# Patient Record
Sex: FEMALE | Race: BLACK OR AFRICAN AMERICAN | NOT HISPANIC OR LATINO | Employment: FULL TIME | RURAL
[De-identification: names, ages, dates, MRNs, and addresses within clinical notes are randomized per-mention and may not be internally consistent; named-entity substitution may affect disease eponyms.]

---

## 2020-12-07 LAB — PAP RECOMMENDATION EXT: NORMAL

## 2021-03-01 ENCOUNTER — HISTORICAL (OUTPATIENT)
Dept: ADMINISTRATIVE | Facility: HOSPITAL | Age: 36
End: 2021-03-01

## 2021-07-06 ENCOUNTER — OFFICE VISIT (OUTPATIENT)
Dept: FAMILY MEDICINE | Facility: CLINIC | Age: 36
End: 2021-07-06
Payer: COMMERCIAL

## 2021-07-06 VITALS
WEIGHT: 279.38 LBS | DIASTOLIC BLOOD PRESSURE: 83 MMHG | OXYGEN SATURATION: 98 % | HEIGHT: 67 IN | SYSTOLIC BLOOD PRESSURE: 127 MMHG | BODY MASS INDEX: 43.85 KG/M2 | TEMPERATURE: 98 F | HEART RATE: 66 BPM

## 2021-07-06 DIAGNOSIS — R53.83 TIREDNESS: Primary | ICD-10-CM

## 2021-07-06 DIAGNOSIS — I10 ESSENTIAL HYPERTENSION: ICD-10-CM

## 2021-07-06 LAB
ALBUMIN SERPL BCP-MCNC: 3.4 G/DL (ref 3.5–5)
ALBUMIN/GLOB SERPL: 0.7 {RATIO}
ALP SERPL-CCNC: 84 U/L (ref 37–98)
ALT SERPL W P-5'-P-CCNC: 21 U/L (ref 13–56)
ANION GAP SERPL CALCULATED.3IONS-SCNC: 7 MMOL/L (ref 7–16)
AST SERPL W P-5'-P-CCNC: 19 U/L (ref 15–37)
BASOPHILS # BLD AUTO: 0.02 K/UL (ref 0–0.2)
BASOPHILS NFR BLD AUTO: 0.3 % (ref 0–1)
BILIRUB SERPL-MCNC: 0.3 MG/DL (ref 0–1.2)
BUN SERPL-MCNC: 13 MG/DL (ref 7–18)
BUN/CREAT SERPL: 16 (ref 6–20)
CALCIUM SERPL-MCNC: 9.3 MG/DL (ref 8.5–10.1)
CHLORIDE SERPL-SCNC: 104 MMOL/L (ref 98–107)
CO2 SERPL-SCNC: 30 MMOL/L (ref 21–32)
CREAT SERPL-MCNC: 0.81 MG/DL (ref 0.55–1.02)
DIFFERENTIAL METHOD BLD: ABNORMAL
EOSINOPHIL # BLD AUTO: 0.08 K/UL (ref 0–0.5)
EOSINOPHIL NFR BLD AUTO: 1.4 % (ref 1–4)
ERYTHROCYTE [DISTWIDTH] IN BLOOD BY AUTOMATED COUNT: 13.1 % (ref 11.5–14.5)
GLOBULIN SER-MCNC: 5.1 G/DL (ref 2–4)
GLUCOSE SERPL-MCNC: 88 MG/DL (ref 74–106)
HCT VFR BLD AUTO: 37.4 % (ref 38–47)
HGB BLD-MCNC: 12.3 G/DL (ref 12–16)
IMM GRANULOCYTES # BLD AUTO: 0.01 K/UL (ref 0–0.04)
IMM GRANULOCYTES NFR BLD: 0.2 % (ref 0–0.4)
LYMPHOCYTES # BLD AUTO: 2.18 K/UL (ref 1–4.8)
LYMPHOCYTES NFR BLD AUTO: 38.1 % (ref 27–41)
MCH RBC QN AUTO: 29.4 PG (ref 27–31)
MCHC RBC AUTO-ENTMCNC: 32.9 G/DL (ref 32–36)
MCV RBC AUTO: 89.3 FL (ref 80–96)
MONOCYTES # BLD AUTO: 0.44 K/UL (ref 0–0.8)
MONOCYTES NFR BLD AUTO: 7.7 % (ref 2–6)
MPC BLD CALC-MCNC: 11.8 FL (ref 9.4–12.4)
NEUTROPHILS # BLD AUTO: 2.99 K/UL (ref 1.8–7.7)
NEUTROPHILS NFR BLD AUTO: 52.3 % (ref 53–65)
NRBC # BLD AUTO: 0 X10E3/UL
NRBC, AUTO (.00): 0 %
PLATELET # BLD AUTO: 299 K/UL (ref 150–400)
POTASSIUM SERPL-SCNC: 3.7 MMOL/L (ref 3.5–5.1)
PROT SERPL-MCNC: 8.5 G/DL (ref 6.4–8.2)
RBC # BLD AUTO: 4.19 M/UL (ref 4.2–5.4)
SODIUM SERPL-SCNC: 137 MMOL/L (ref 136–145)
WBC # BLD AUTO: 5.72 K/UL (ref 4.5–11)

## 2021-07-06 PROCEDURE — 85025 CBC WITH DIFFERENTIAL: ICD-10-PCS | Mod: ,,, | Performed by: CLINICAL MEDICAL LABORATORY

## 2021-07-06 PROCEDURE — 80053 COMPREHENSIVE METABOLIC PANEL: ICD-10-PCS | Mod: ,,, | Performed by: CLINICAL MEDICAL LABORATORY

## 2021-07-06 PROCEDURE — 85025 COMPLETE CBC W/AUTO DIFF WBC: CPT | Mod: ,,, | Performed by: CLINICAL MEDICAL LABORATORY

## 2021-07-06 PROCEDURE — 80053 COMPREHEN METABOLIC PANEL: CPT | Mod: ,,, | Performed by: CLINICAL MEDICAL LABORATORY

## 2021-07-06 PROCEDURE — 99213 OFFICE O/P EST LOW 20 MIN: CPT | Mod: ,,, | Performed by: NURSE PRACTITIONER

## 2021-07-06 PROCEDURE — 99213 PR OFFICE/OUTPT VISIT, EST, LEVL III, 20-29 MIN: ICD-10-PCS | Mod: ,,, | Performed by: NURSE PRACTITIONER

## 2021-07-06 RX ORDER — LOSARTAN POTASSIUM 50 MG/1
50 TABLET ORAL DAILY
COMMUNITY
End: 2021-07-21

## 2021-07-06 RX ORDER — HYDROCHLOROTHIAZIDE 12.5 MG/1
12.5 CAPSULE ORAL DAILY
Qty: 30 CAPSULE | Refills: 11 | Status: SHIPPED | OUTPATIENT
Start: 2021-07-06 | End: 2022-08-16

## 2021-07-06 RX ORDER — HYDROCHLOROTHIAZIDE 25 MG/1
25 TABLET ORAL DAILY
COMMUNITY
End: 2021-07-06 | Stop reason: ALTCHOICE

## 2021-12-29 ENCOUNTER — INFUSION (OUTPATIENT)
Dept: INFECTIOUS DISEASES | Facility: HOSPITAL | Age: 36
End: 2021-12-29
Attending: NURSE PRACTITIONER
Payer: MEDICAID

## 2021-12-29 VITALS
RESPIRATION RATE: 16 BRPM | SYSTOLIC BLOOD PRESSURE: 133 MMHG | HEART RATE: 71 BPM | DIASTOLIC BLOOD PRESSURE: 69 MMHG | OXYGEN SATURATION: 98 %

## 2021-12-29 DIAGNOSIS — U07.1 COVID: Primary | ICD-10-CM

## 2021-12-29 PROCEDURE — M0243 CASIRIVI AND IMDEVI INFUSION: HCPCS

## 2021-12-29 PROCEDURE — 63600175 PHARM REV CODE 636 W HCPCS

## 2021-12-29 PROCEDURE — 25000003 PHARM REV CODE 250

## 2021-12-29 RX ORDER — SODIUM CHLORIDE 0.9 % (FLUSH) 0.9 %
10 SYRINGE (ML) INJECTION
Status: DISCONTINUED | OUTPATIENT
Start: 2021-12-29 | End: 2022-04-03

## 2021-12-29 RX ORDER — ACETAMINOPHEN 325 MG/1
650 TABLET ORAL ONCE AS NEEDED
Status: DISCONTINUED | OUTPATIENT
Start: 2021-12-29 | End: 2022-04-03

## 2021-12-29 RX ORDER — METHYLPREDNISOLONE SOD SUCC 125 MG
40 VIAL (EA) INJECTION ONCE AS NEEDED
Status: DISCONTINUED | OUTPATIENT
Start: 2021-12-29 | End: 2022-04-03

## 2021-12-29 RX ORDER — ALBUTEROL SULFATE 90 UG/1
2 AEROSOL, METERED RESPIRATORY (INHALATION)
Status: DISCONTINUED | OUTPATIENT
Start: 2021-12-29 | End: 2022-04-03

## 2021-12-29 RX ORDER — ONDANSETRON 4 MG/1
4 TABLET, ORALLY DISINTEGRATING ORAL ONCE AS NEEDED
Status: DISCONTINUED | OUTPATIENT
Start: 2021-12-29 | End: 2022-04-03

## 2021-12-29 RX ORDER — DIPHENHYDRAMINE HYDROCHLORIDE 50 MG/ML
25 INJECTION INTRAMUSCULAR; INTRAVENOUS ONCE AS NEEDED
Status: DISCONTINUED | OUTPATIENT
Start: 2021-12-29 | End: 2022-04-03

## 2021-12-29 RX ORDER — EPINEPHRINE 0.3 MG/.3ML
0.3 INJECTION SUBCUTANEOUS
Status: DISCONTINUED | OUTPATIENT
Start: 2021-12-29 | End: 2022-04-03

## 2021-12-29 RX ADMIN — CASIRIVIMAB AND IMDEVIMAB 600 MG: 600; 600 INJECTION, SOLUTION, CONCENTRATE INTRAVENOUS at 01:12

## 2022-03-31 ENCOUNTER — OFFICE VISIT (OUTPATIENT)
Dept: FAMILY MEDICINE | Facility: CLINIC | Age: 37
End: 2022-03-31
Payer: MEDICAID

## 2022-03-31 VITALS
SYSTOLIC BLOOD PRESSURE: 132 MMHG | DIASTOLIC BLOOD PRESSURE: 78 MMHG | HEIGHT: 67 IN | OXYGEN SATURATION: 100 % | RESPIRATION RATE: 20 BRPM | BODY MASS INDEX: 44.32 KG/M2 | WEIGHT: 282.38 LBS | HEART RATE: 70 BPM | TEMPERATURE: 98 F

## 2022-03-31 DIAGNOSIS — K65.1: Primary | ICD-10-CM

## 2022-03-31 PROCEDURE — 99213 OFFICE O/P EST LOW 20 MIN: CPT | Mod: ,,, | Performed by: NURSE PRACTITIONER

## 2022-03-31 PROCEDURE — 99213 PR OFFICE/OUTPT VISIT, EST, LEVL III, 20-29 MIN: ICD-10-PCS | Mod: ,,, | Performed by: NURSE PRACTITIONER

## 2022-03-31 PROCEDURE — 4010F ACE/ARB THERAPY RXD/TAKEN: CPT | Mod: CPTII,,, | Performed by: NURSE PRACTITIONER

## 2022-03-31 PROCEDURE — 3078F DIAST BP <80 MM HG: CPT | Mod: CPTII,,, | Performed by: NURSE PRACTITIONER

## 2022-03-31 PROCEDURE — 3075F PR MOST RECENT SYSTOLIC BLOOD PRESS GE 130-139MM HG: ICD-10-PCS | Mod: CPTII,,, | Performed by: NURSE PRACTITIONER

## 2022-03-31 PROCEDURE — 1159F MED LIST DOCD IN RCRD: CPT | Mod: CPTII,,, | Performed by: NURSE PRACTITIONER

## 2022-03-31 PROCEDURE — 3075F SYST BP GE 130 - 139MM HG: CPT | Mod: CPTII,,, | Performed by: NURSE PRACTITIONER

## 2022-03-31 PROCEDURE — 3008F BODY MASS INDEX DOCD: CPT | Mod: CPTII,,, | Performed by: NURSE PRACTITIONER

## 2022-03-31 PROCEDURE — 4010F PR ACE/ARB THEARPY RXD/TAKEN: ICD-10-PCS | Mod: CPTII,,, | Performed by: NURSE PRACTITIONER

## 2022-03-31 PROCEDURE — 1159F PR MEDICATION LIST DOCUMENTED IN MEDICAL RECORD: ICD-10-PCS | Mod: CPTII,,, | Performed by: NURSE PRACTITIONER

## 2022-03-31 PROCEDURE — 3078F PR MOST RECENT DIASTOLIC BLOOD PRESSURE < 80 MM HG: ICD-10-PCS | Mod: CPTII,,, | Performed by: NURSE PRACTITIONER

## 2022-03-31 PROCEDURE — 3008F PR BODY MASS INDEX (BMI) DOCUMENTED: ICD-10-PCS | Mod: CPTII,,, | Performed by: NURSE PRACTITIONER

## 2022-03-31 RX ORDER — MUPIROCIN 20 MG/G
OINTMENT TOPICAL 3 TIMES DAILY
Qty: 22 G | Refills: 0 | Status: SHIPPED | OUTPATIENT
Start: 2022-03-31 | End: 2023-02-17

## 2022-03-31 RX ORDER — ACETAMINOPHEN AND CODEINE PHOSPHATE 300; 30 MG/1; MG/1
1 TABLET ORAL EVERY 4 HOURS PRN
Qty: 24 TABLET | Refills: 0 | Status: SHIPPED | OUTPATIENT
Start: 2022-03-31 | End: 2023-02-17

## 2022-03-31 RX ORDER — INDOMETHACIN 50 MG/1
50 CAPSULE ORAL 2 TIMES DAILY WITH MEALS
Qty: 20 CAPSULE | Refills: 0 | Status: SHIPPED | OUTPATIENT
Start: 2022-03-31 | End: 2022-04-10

## 2022-03-31 RX ORDER — SULFAMETHOXAZOLE AND TRIMETHOPRIM 800; 160 MG/1; MG/1
1 TABLET ORAL 2 TIMES DAILY
Qty: 14 TABLET | Refills: 0 | Status: SHIPPED | OUTPATIENT
Start: 2022-03-31 | End: 2022-04-03

## 2022-04-03 PROBLEM — R53.83 TIREDNESS: Status: RESOLVED | Noted: 2021-07-06 | Resolved: 2022-04-03

## 2022-04-03 RX ORDER — DOXYCYCLINE 100 MG/1
100 CAPSULE ORAL 2 TIMES DAILY
Qty: 14 CAPSULE | Refills: 0 | Status: SHIPPED | OUTPATIENT
Start: 2022-04-03 | End: 2022-04-10

## 2022-04-03 NOTE — PROGRESS NOTES
New Clinic Note    Mateus Ballesteros is a 37 y.o. female presents to the clinic with c/o abscess to right upper abd on and off for a year, now has flared back up again, was I and D by another provider months ago but pt states drainage was only blood.  Started out as a elevated lesion with no symptoms, months later abscess formed in it.      Chief complaints    Chief Complaint   Patient presents with    risen     Under right abd        Subjective:    HPI       Current Outpatient Medications:     hydroCHLOROthiazide (MICROZIDE) 12.5 mg capsule, Take 1 capsule (12.5 mg total) by mouth once daily., Disp: 30 capsule, Rfl: 11    losartan (COZAAR) 50 MG tablet, TAKE ONE (1) TABLET BY MOUTH EVERY DAY, Disp: 90 tablet, Rfl: 0    acetaminophen-codeine 300-30mg (TYLENOL #3) 300-30 mg Tab, Take 1 tablet by mouth every 4 (four) hours as needed (abscess pain)., Disp: 24 tablet, Rfl: 0    doxycycline (VIBRAMYCIN) 100 MG Cap, Take 1 capsule (100 mg total) by mouth 2 (two) times daily. for 7 days, Disp: 14 capsule, Rfl: 0    indomethacin (INDOCIN) 50 MG capsule, Take 1 capsule (50 mg total) by mouth 2 (two) times daily with meals. for 10 days, Disp: 20 capsule, Rfl: 0    mupirocin (BACTROBAN) 2 % ointment, Apply topically 3 (three) times daily., Disp: 22 g, Rfl: 0  No current facility-administered medications for this visit.     No past surgical history on file.     Social History     Socioeconomic History    Marital status: Single    Number of children: 2    Years of education: 12    Highest education level: 12th grade   Occupational History    Occupation: [employer]   Tobacco Use    Smoking status: Former Smoker     Types: Cigars    Smokeless tobacco: Never Used   Substance and Sexual Activity    Alcohol use: Yes     Comment: occasional    Drug use: Never    Sexual activity: Yes        Review of Systems   Constitutional: Negative.  Negative for fatigue and fever.   Respiratory: Negative.    Cardiovascular:  "Negative.    Gastrointestinal: Negative.    Endocrine: Negative.    Integumentary:  Positive for wound and mole/lesion.   Neurological: Negative.    Psychiatric/Behavioral: Negative.    All other systems reviewed and are negative.       Objective:  /78   Pulse 70   Temp 97.5 °F (36.4 °C)   Resp 20   Ht 5' 7" (1.702 m)   Wt 128.1 kg (282 lb 6.4 oz)   SpO2 100%   BMI 44.23 kg/m²      Physical Exam  Constitutional:       Appearance: Normal appearance.   Cardiovascular:      Rate and Rhythm: Normal rate and regular rhythm.      Pulses: Normal pulses.      Heart sounds: Normal heart sounds.   Pulmonary:      Effort: Pulmonary effort is normal.      Breath sounds: Normal breath sounds.   Musculoskeletal:         General: Normal range of motion.      Cervical back: Normal range of motion.   Skin:     General: Skin is warm and dry.      Findings: Lesion present.      Comments: There is an elevated ulcerative , erythematous approx 5cm long and 2cm wide lesion to right upper abd. Skin is broken with area of yellowish appearance.  No purulent drainage when pressed, only bloody discharge.    Neurological:      General: No focal deficit present.      Mental Status: She is alert and oriented to person, place, and time.   Psychiatric:         Mood and Affect: Mood normal.         Behavior: Behavior normal.          Assessment and Plan:  1. Right upper quadrant abscess         Right upper quadrant abscess  -     indomethacin (INDOCIN) 50 MG capsule; Take 1 capsule (50 mg total) by mouth 2 (two) times daily with meals. for 10 days  Dispense: 20 capsule; Refill: 0  -     Discontinue: sulfamethoxazole-trimethoprim 800-160mg (BACTRIM DS) 800-160 mg Tab; Take 1 tablet by mouth 2 (two) times daily. for 7 days  Dispense: 14 tablet; Refill: 0  -     mupirocin (BACTROBAN) 2 % ointment; Apply topically 3 (three) times daily.  Dispense: 22 g; Refill: 0  -     doxycycline (VIBRAMYCIN) 100 MG Cap; Take 1 capsule (100 mg total) by " mouth 2 (two) times daily. for 7 days  Dispense: 14 capsule; Refill: 0  -     Ambulatory referral/consult to General Surgery; Future; Expected date: 04/10/2022    Other orders  -     acetaminophen-codeine 300-30mg (TYLENOL #3) 300-30 mg Tab; Take 1 tablet by mouth every 4 (four) hours as needed (abscess pain).  Dispense: 24 tablet; Refill: 0         There are no problems to display for this patient.       Follow up in about 4 weeks (around 4/28/2022).        performed, no unusual activities

## 2022-04-04 RX ORDER — FLUCONAZOLE 150 MG/1
150 TABLET ORAL DAILY
Qty: 1 TABLET | Refills: 0 | Status: SHIPPED | OUTPATIENT
Start: 2022-04-04 | End: 2022-04-05

## 2022-08-16 ENCOUNTER — OFFICE VISIT (OUTPATIENT)
Dept: FAMILY MEDICINE | Facility: CLINIC | Age: 37
End: 2022-08-16
Payer: MEDICAID

## 2022-08-16 VITALS
TEMPERATURE: 98 F | OXYGEN SATURATION: 96 % | BODY MASS INDEX: 45.55 KG/M2 | RESPIRATION RATE: 20 BRPM | DIASTOLIC BLOOD PRESSURE: 80 MMHG | WEIGHT: 290.19 LBS | HEIGHT: 67 IN | SYSTOLIC BLOOD PRESSURE: 138 MMHG | HEART RATE: 62 BPM

## 2022-08-16 DIAGNOSIS — I10 ESSENTIAL HYPERTENSION: Primary | ICD-10-CM

## 2022-08-16 DIAGNOSIS — R20.2 PARESTHESIA: ICD-10-CM

## 2022-08-16 LAB
ALBUMIN SERPL BCP-MCNC: 3.2 G/DL (ref 3.5–5)
ALBUMIN/GLOB SERPL: 0.8 {RATIO}
ALP SERPL-CCNC: 80 U/L (ref 37–98)
ALT SERPL W P-5'-P-CCNC: 19 U/L (ref 13–56)
ANION GAP SERPL CALCULATED.3IONS-SCNC: 10 MMOL/L (ref 7–16)
AST SERPL W P-5'-P-CCNC: 21 U/L (ref 15–37)
BILIRUB SERPL-MCNC: 0.4 MG/DL (ref 0–1.2)
BUN SERPL-MCNC: 9 MG/DL (ref 7–18)
BUN/CREAT SERPL: 12 (ref 6–20)
CALCIUM SERPL-MCNC: 8.9 MG/DL (ref 8.5–10.1)
CHLORIDE SERPL-SCNC: 106 MMOL/L (ref 98–107)
CO2 SERPL-SCNC: 25 MMOL/L (ref 21–32)
CREAT SERPL-MCNC: 0.76 MG/DL (ref 0.55–1.02)
EGFR (NO RACE VARIABLE) (RUSH/TITUS): 104 ML/MIN/1.73M²
FOLATE SERPL-MCNC: 6.4 NG/ML (ref 3.1–17.5)
GLOBULIN SER-MCNC: 3.9 G/DL (ref 2–4)
GLUCOSE SERPL-MCNC: 88 MG/DL (ref 74–106)
POTASSIUM SERPL-SCNC: 4 MMOL/L (ref 3.5–5.1)
PROT SERPL-MCNC: 7.1 G/DL (ref 6.4–8.2)
SODIUM SERPL-SCNC: 137 MMOL/L (ref 136–145)
TSH SERPL DL<=0.005 MIU/L-ACNC: 2.98 UIU/ML (ref 0.36–3.74)
VIT B12 SERPL-MCNC: 218 PG/ML (ref 193–986)

## 2022-08-16 PROCEDURE — 84443 ASSAY THYROID STIM HORMONE: CPT | Mod: ,,, | Performed by: CLINICAL MEDICAL LABORATORY

## 2022-08-16 PROCEDURE — 1160F PR REVIEW ALL MEDS BY PRESCRIBER/CLIN PHARMACIST DOCUMENTED: ICD-10-PCS | Mod: CPTII,,, | Performed by: STUDENT IN AN ORGANIZED HEALTH CARE EDUCATION/TRAINING PROGRAM

## 2022-08-16 PROCEDURE — 3008F PR BODY MASS INDEX (BMI) DOCUMENTED: ICD-10-PCS | Mod: CPTII,,, | Performed by: STUDENT IN AN ORGANIZED HEALTH CARE EDUCATION/TRAINING PROGRAM

## 2022-08-16 PROCEDURE — 1160F RVW MEDS BY RX/DR IN RCRD: CPT | Mod: CPTII,,, | Performed by: STUDENT IN AN ORGANIZED HEALTH CARE EDUCATION/TRAINING PROGRAM

## 2022-08-16 PROCEDURE — 82607 VITAMIN B12/FOLATE, SERUM PANEL: ICD-10-PCS | Mod: ,,, | Performed by: CLINICAL MEDICAL LABORATORY

## 2022-08-16 PROCEDURE — 1159F MED LIST DOCD IN RCRD: CPT | Mod: CPTII,,, | Performed by: STUDENT IN AN ORGANIZED HEALTH CARE EDUCATION/TRAINING PROGRAM

## 2022-08-16 PROCEDURE — 3075F PR MOST RECENT SYSTOLIC BLOOD PRESS GE 130-139MM HG: ICD-10-PCS | Mod: CPTII,,, | Performed by: STUDENT IN AN ORGANIZED HEALTH CARE EDUCATION/TRAINING PROGRAM

## 2022-08-16 PROCEDURE — 82746 ASSAY OF FOLIC ACID SERUM: CPT | Mod: ,,, | Performed by: CLINICAL MEDICAL LABORATORY

## 2022-08-16 PROCEDURE — 80053 COMPREHENSIVE METABOLIC PANEL: ICD-10-PCS | Mod: ,,, | Performed by: CLINICAL MEDICAL LABORATORY

## 2022-08-16 PROCEDURE — 1159F PR MEDICATION LIST DOCUMENTED IN MEDICAL RECORD: ICD-10-PCS | Mod: CPTII,,, | Performed by: STUDENT IN AN ORGANIZED HEALTH CARE EDUCATION/TRAINING PROGRAM

## 2022-08-16 PROCEDURE — 3079F DIAST BP 80-89 MM HG: CPT | Mod: CPTII,,, | Performed by: STUDENT IN AN ORGANIZED HEALTH CARE EDUCATION/TRAINING PROGRAM

## 2022-08-16 PROCEDURE — 4010F PR ACE/ARB THEARPY RXD/TAKEN: ICD-10-PCS | Mod: CPTII,,, | Performed by: STUDENT IN AN ORGANIZED HEALTH CARE EDUCATION/TRAINING PROGRAM

## 2022-08-16 PROCEDURE — 99215 OFFICE O/P EST HI 40 MIN: CPT | Mod: ,,, | Performed by: STUDENT IN AN ORGANIZED HEALTH CARE EDUCATION/TRAINING PROGRAM

## 2022-08-16 PROCEDURE — 4010F ACE/ARB THERAPY RXD/TAKEN: CPT | Mod: CPTII,,, | Performed by: STUDENT IN AN ORGANIZED HEALTH CARE EDUCATION/TRAINING PROGRAM

## 2022-08-16 PROCEDURE — 3075F SYST BP GE 130 - 139MM HG: CPT | Mod: CPTII,,, | Performed by: STUDENT IN AN ORGANIZED HEALTH CARE EDUCATION/TRAINING PROGRAM

## 2022-08-16 PROCEDURE — 99215 PR OFFICE/OUTPT VISIT, EST, LEVL V, 40-54 MIN: ICD-10-PCS | Mod: ,,, | Performed by: STUDENT IN AN ORGANIZED HEALTH CARE EDUCATION/TRAINING PROGRAM

## 2022-08-16 PROCEDURE — 3079F PR MOST RECENT DIASTOLIC BLOOD PRESSURE 80-89 MM HG: ICD-10-PCS | Mod: CPTII,,, | Performed by: STUDENT IN AN ORGANIZED HEALTH CARE EDUCATION/TRAINING PROGRAM

## 2022-08-16 PROCEDURE — 82746 VITAMIN B12/FOLATE, SERUM PANEL: ICD-10-PCS | Mod: ,,, | Performed by: CLINICAL MEDICAL LABORATORY

## 2022-08-16 PROCEDURE — 82607 VITAMIN B-12: CPT | Mod: ,,, | Performed by: CLINICAL MEDICAL LABORATORY

## 2022-08-16 PROCEDURE — 80053 COMPREHEN METABOLIC PANEL: CPT | Mod: ,,, | Performed by: CLINICAL MEDICAL LABORATORY

## 2022-08-16 PROCEDURE — 3008F BODY MASS INDEX DOCD: CPT | Mod: CPTII,,, | Performed by: STUDENT IN AN ORGANIZED HEALTH CARE EDUCATION/TRAINING PROGRAM

## 2022-08-16 PROCEDURE — 84443 TSH: ICD-10-PCS | Mod: ,,, | Performed by: CLINICAL MEDICAL LABORATORY

## 2022-08-16 RX ORDER — HYDROCHLOROTHIAZIDE 12.5 MG/1
12.5 CAPSULE ORAL DAILY
Qty: 90 CAPSULE | Refills: 0 | Status: SHIPPED | OUTPATIENT
Start: 2022-08-16 | End: 2022-09-16 | Stop reason: SDUPTHER

## 2022-08-16 RX ORDER — LOSARTAN POTASSIUM 50 MG/1
50 TABLET ORAL DAILY
Qty: 90 TABLET | Refills: 0 | Status: SHIPPED | OUTPATIENT
Start: 2022-08-16 | End: 2023-02-17

## 2022-08-16 NOTE — ASSESSMENT & PLAN NOTE
Above goal.  Patient has been out of blood pressure medications since Saturday.  Will obtain labs today and refill HCTZ and losartan. Will obtain home BP cuff; called pharmacy to request they provide this with her medications today. Counseled extensively on importance of tight BP control < 130/80 to prevent comorbidities in the future. BP log provided, and she will monitor at home and have TM follow-up with me to assess need for dose increase of HCTZ.

## 2022-08-16 NOTE — PROGRESS NOTES
"Subjective:       Patient ID: Mateus Ballesteros is a 37 y.o. female.    Chief Complaint: Medication Refill and follow up with BP    HPI     37-year-old woman with a medical problems listed below who presents to clinic for follow-up of hypertension.  She has been doing fairly well with no adverse effects noted.  She ran out of her blood pressure medications on Saturday; her blood pressure has remained fairly well controlled however.  She does not have a blood pressure monitor at home.    Objective:       /80   Pulse 62   Temp 97.8 °F (36.6 °C)   Resp 20   Ht 5' 7" (1.702 m)   Wt 131.6 kg (290 lb 3.2 oz)   SpO2 96%   BMI 45.45 kg/m²     Physical Exam  Gen: well-groomed, well-dressed. No apparent distress  HEENT:  NCAT, symmetric  Pulm: normal work of breathing, no accessory muscle use; speaking complete sentences  Neuro: CN II-XII grossly normal; normal gait; sensation intact to b/l lower extremities; motor function normal   Ext: no peripheral edema; warm, well-perfused.  Psych: pleasant affect, congruent mood. Linear thought; good eye contact  SKIN: no rashes present on face, neck, hands, feet    Assessment / Plan       Problem List Items Addressed This Visit        Cardiac/Vascular    Essential hypertension - Primary     Above goal.  Patient has been out of blood pressure medications since Saturday.  Will obtain labs today and refill HCTZ and losartan. Will obtain home BP cuff; called pharmacy to request they provide this with her medications today. Counseled extensively on importance of tight BP control < 130/80 to prevent comorbidities in the future. BP log provided, and she will monitor at home and have TM follow-up with me to assess need for dose increase of HCTZ.            Relevant Medications    hydroCHLOROthiazide (MICROZIDE) 12.5 mg capsule    losartan (COZAAR) 50 MG tablet    Other Relevant Orders    Comprehensive Metabolic Panel       Other    Paresthesia     Patient is having occasional tingling " sensations in the bilateral feet.  She occasionally experiences similar sensation in the bilateral hands, especially after she mops.  She works in home health.  She has not had any weakness, gait difficulty, or other sensory changes.  Will obtain limited workup today for polyneuropathy, including B12 TSH, and glucose.  Advised patient to monitor symptoms and notify me immediately if these persist or worsen.  If so, will consider additional investigation with EMG and additional labs including TAMI, ESR, SPEP, and UPEP.           Relevant Orders    TSH    Vitamin B12 & Folate          Attestation of Time    Patient was counseled about overall management, including education for patient and when relevant for family, as well as anticipated benefits and possible risks of treatment options. Face-to-face time during this encounter was 20 minutes.     Non-face-to-face time -- including time preparing to see the patient; reviewing separately obtained history; counseling and educating the patient, family and/or caregivers; ordering medications, test, or procedures; referring and communicating with other health care professionals; documenting clinical information in the electronic or other health record; care coordination; independently interpreting results and communicating results to the patient, family, and/or caregivers -- was 20 minutes    Total time for this visit was 40 minutes.

## 2022-08-16 NOTE — ASSESSMENT & PLAN NOTE
Patient is having occasional tingling sensations in the bilateral feet.  She occasionally experiences similar sensation in the bilateral hands, especially after she mops.  She works in home health.  She has not had any weakness, gait difficulty, or other sensory changes.  Will obtain limited workup today for polyneuropathy, including B12 TSH, and glucose.  Advised patient to monitor symptoms and notify me immediately if these persist or worsen.  If so, will consider additional investigation with EMG and additional labs including TAMI, ESR, SPEP, and UPEP.

## 2022-08-18 ENCOUNTER — PATIENT MESSAGE (OUTPATIENT)
Dept: FAMILY MEDICINE | Facility: CLINIC | Age: 37
End: 2022-08-18
Payer: MEDICAID

## 2022-09-16 ENCOUNTER — OFFICE VISIT (OUTPATIENT)
Dept: FAMILY MEDICINE | Facility: CLINIC | Age: 37
End: 2022-09-16
Payer: MEDICAID

## 2022-09-16 VITALS
WEIGHT: 131.63 LBS | HEIGHT: 67 IN | DIASTOLIC BLOOD PRESSURE: 85 MMHG | SYSTOLIC BLOOD PRESSURE: 140 MMHG | BODY MASS INDEX: 20.66 KG/M2 | HEART RATE: 67 BPM

## 2022-09-16 DIAGNOSIS — I10 ESSENTIAL HYPERTENSION: Primary | ICD-10-CM

## 2022-09-16 PROCEDURE — 3077F PR MOST RECENT SYSTOLIC BLOOD PRESSURE >= 140 MM HG: ICD-10-PCS | Mod: GT,CPTII,, | Performed by: STUDENT IN AN ORGANIZED HEALTH CARE EDUCATION/TRAINING PROGRAM

## 2022-09-16 PROCEDURE — 3079F PR MOST RECENT DIASTOLIC BLOOD PRESSURE 80-89 MM HG: ICD-10-PCS | Mod: GT,CPTII,, | Performed by: STUDENT IN AN ORGANIZED HEALTH CARE EDUCATION/TRAINING PROGRAM

## 2022-09-16 PROCEDURE — 4010F PR ACE/ARB THEARPY RXD/TAKEN: ICD-10-PCS | Mod: GT,CPTII,, | Performed by: STUDENT IN AN ORGANIZED HEALTH CARE EDUCATION/TRAINING PROGRAM

## 2022-09-16 PROCEDURE — 1159F MED LIST DOCD IN RCRD: CPT | Mod: GT,CPTII,, | Performed by: STUDENT IN AN ORGANIZED HEALTH CARE EDUCATION/TRAINING PROGRAM

## 2022-09-16 PROCEDURE — 99213 OFFICE O/P EST LOW 20 MIN: CPT | Mod: GT,,, | Performed by: STUDENT IN AN ORGANIZED HEALTH CARE EDUCATION/TRAINING PROGRAM

## 2022-09-16 PROCEDURE — 4010F ACE/ARB THERAPY RXD/TAKEN: CPT | Mod: GT,CPTII,, | Performed by: STUDENT IN AN ORGANIZED HEALTH CARE EDUCATION/TRAINING PROGRAM

## 2022-09-16 PROCEDURE — 3079F DIAST BP 80-89 MM HG: CPT | Mod: GT,CPTII,, | Performed by: STUDENT IN AN ORGANIZED HEALTH CARE EDUCATION/TRAINING PROGRAM

## 2022-09-16 PROCEDURE — 1159F PR MEDICATION LIST DOCUMENTED IN MEDICAL RECORD: ICD-10-PCS | Mod: GT,CPTII,, | Performed by: STUDENT IN AN ORGANIZED HEALTH CARE EDUCATION/TRAINING PROGRAM

## 2022-09-16 PROCEDURE — 3008F BODY MASS INDEX DOCD: CPT | Mod: GT,CPTII,, | Performed by: STUDENT IN AN ORGANIZED HEALTH CARE EDUCATION/TRAINING PROGRAM

## 2022-09-16 PROCEDURE — 1160F PR REVIEW ALL MEDS BY PRESCRIBER/CLIN PHARMACIST DOCUMENTED: ICD-10-PCS | Mod: GT,CPTII,, | Performed by: STUDENT IN AN ORGANIZED HEALTH CARE EDUCATION/TRAINING PROGRAM

## 2022-09-16 PROCEDURE — 1160F RVW MEDS BY RX/DR IN RCRD: CPT | Mod: GT,CPTII,, | Performed by: STUDENT IN AN ORGANIZED HEALTH CARE EDUCATION/TRAINING PROGRAM

## 2022-09-16 PROCEDURE — 99213 PR OFFICE/OUTPT VISIT, EST, LEVL III, 20-29 MIN: ICD-10-PCS | Mod: GT,,, | Performed by: STUDENT IN AN ORGANIZED HEALTH CARE EDUCATION/TRAINING PROGRAM

## 2022-09-16 PROCEDURE — 3077F SYST BP >= 140 MM HG: CPT | Mod: GT,CPTII,, | Performed by: STUDENT IN AN ORGANIZED HEALTH CARE EDUCATION/TRAINING PROGRAM

## 2022-09-16 PROCEDURE — 3008F PR BODY MASS INDEX (BMI) DOCUMENTED: ICD-10-PCS | Mod: GT,CPTII,, | Performed by: STUDENT IN AN ORGANIZED HEALTH CARE EDUCATION/TRAINING PROGRAM

## 2022-09-16 RX ORDER — HYDROCHLOROTHIAZIDE 25 MG/1
25 TABLET ORAL DAILY
Qty: 30 TABLET | Refills: 1 | Status: SHIPPED | OUTPATIENT
Start: 2022-09-16 | End: 2023-02-17 | Stop reason: SDUPTHER

## 2022-09-16 RX ORDER — HYDROCHLOROTHIAZIDE 12.5 MG/1
25 CAPSULE ORAL DAILY
Qty: 90 CAPSULE | Refills: 0 | Status: SHIPPED | OUTPATIENT
Start: 2022-09-16 | End: 2023-02-17

## 2022-09-16 NOTE — PROGRESS NOTES
"Subjective:       Patient ID: Mateus Ballesteros is a 37 y.o. female.    Chief Complaint: Medication Refill (Blood pressure check )    HPI    37-year-old woman with the medical problems listed below who is being evaluated via telemedicine for high blood pressure.    She was previously seen in clinic on 8.16.22.    From last visit:     "Above goal.  Patient has been out of blood pressure medications since Saturday.  Will obtain labs today and refill HCTZ and losartan. Will obtain home BP cuff; called pharmacy to request they provide this with her medications today. Counseled extensively on importance of tight BP control < 130/80 to prevent comorbidities in the future. BP log provided, and she will monitor at home and have TM follow-up with me to assess need for dose increase of HCTZ."    She has been monitoring her BP at home and systolic readings are as follows:     169  156  144  151  134  130  138  152  154  140  146  138    She has been taking     50 mg losartan daily and 12.5 mg HCTZ. Denies any adverse effects.         Objective:      Physical Exam    Not able to be obtained   Assessment / Plan:       Problem List Items Addressed This Visit          Cardiac/Vascular    Essential hypertension - Primary     Elevated above goal. Increase HCTZ to 25 mg daily. Monitor for improvement; f/u 3-4 weeks.             Plan:     This encounter was provided via secure, real time two-way connection with the patient (telemedicine). The patient has provided informed consent to have an exam conducted via telemedicine and I have addressed any questions or concerns.  I have verified the location and the identity of the patient to the best extent possible.    This encounter could not be completed using real-time audio/visual communication. Given the imperative during the current public health emergency for patients to avoid travel when possible to avoid risk to themselves or others, this visit was conducted with real-time audio only.  " Video was not available because  the patient could not get [place]    Encounter Timing    Face to face encounter time 10 minutes.    Non face to face encounter time 15 minutes.  Reviewing separate obtained history, counseling and educating the patient, family and/or other caregivers, ordering medications,tests or procedures; referring and communicating with other health care professionals; documenting clinical information in the electronic medical record; care coordination; independently interpreting results and communicating results to the patient, family, and/or caregivers.    Total time for visit  25 minutes      My location (distant site)  Clinic    Patient Location ( Originating site)  Home

## 2022-12-12 ENCOUNTER — TELEPHONE (OUTPATIENT)
Dept: FAMILY MEDICINE | Facility: CLINIC | Age: 37
End: 2022-12-12
Payer: MEDICAID

## 2022-12-12 DIAGNOSIS — I10 ESSENTIAL HYPERTENSION: ICD-10-CM

## 2022-12-12 RX ORDER — LOSARTAN POTASSIUM 50 MG/1
50 TABLET ORAL DAILY
Qty: 90 TABLET | Refills: 0 | Status: CANCELLED | OUTPATIENT
Start: 2022-12-12

## 2022-12-29 LAB — HIV AG/AB 4TH GEN: NORMAL

## 2023-02-17 ENCOUNTER — OFFICE VISIT (OUTPATIENT)
Dept: FAMILY MEDICINE | Facility: CLINIC | Age: 38
End: 2023-02-17

## 2023-02-17 VITALS
SYSTOLIC BLOOD PRESSURE: 130 MMHG | DIASTOLIC BLOOD PRESSURE: 78 MMHG | TEMPERATURE: 98 F | HEIGHT: 67 IN | WEIGHT: 285 LBS | BODY MASS INDEX: 44.73 KG/M2 | HEART RATE: 74 BPM | OXYGEN SATURATION: 100 %

## 2023-02-17 DIAGNOSIS — F32.2 SEVERE DEPRESSION: Primary | ICD-10-CM

## 2023-02-17 DIAGNOSIS — I10 ESSENTIAL HYPERTENSION: ICD-10-CM

## 2023-02-17 LAB
ANION GAP SERPL CALCULATED.3IONS-SCNC: 10 MMOL/L (ref 7–16)
BUN SERPL-MCNC: 9 MG/DL (ref 7–18)
BUN/CREAT SERPL: 11 (ref 6–20)
CALCIUM SERPL-MCNC: 9.6 MG/DL (ref 8.5–10.1)
CHLORIDE SERPL-SCNC: 103 MMOL/L (ref 98–107)
CO2 SERPL-SCNC: 27 MMOL/L (ref 21–32)
CREAT SERPL-MCNC: 0.83 MG/DL (ref 0.55–1.02)
EGFR (NO RACE VARIABLE) (RUSH/TITUS): 93 ML/MIN/1.73M²
GLUCOSE SERPL-MCNC: 74 MG/DL (ref 74–106)
POTASSIUM SERPL-SCNC: 3.8 MMOL/L (ref 3.5–5.1)
SODIUM SERPL-SCNC: 136 MMOL/L (ref 136–145)

## 2023-02-17 PROCEDURE — 3078F DIAST BP <80 MM HG: CPT | Mod: CPTII,,, | Performed by: STUDENT IN AN ORGANIZED HEALTH CARE EDUCATION/TRAINING PROGRAM

## 2023-02-17 PROCEDURE — 1160F PR REVIEW ALL MEDS BY PRESCRIBER/CLIN PHARMACIST DOCUMENTED: ICD-10-PCS | Mod: CPTII,,, | Performed by: STUDENT IN AN ORGANIZED HEALTH CARE EDUCATION/TRAINING PROGRAM

## 2023-02-17 PROCEDURE — 3008F PR BODY MASS INDEX (BMI) DOCUMENTED: ICD-10-PCS | Mod: CPTII,,, | Performed by: STUDENT IN AN ORGANIZED HEALTH CARE EDUCATION/TRAINING PROGRAM

## 2023-02-17 PROCEDURE — 3075F PR MOST RECENT SYSTOLIC BLOOD PRESS GE 130-139MM HG: ICD-10-PCS | Mod: CPTII,,, | Performed by: STUDENT IN AN ORGANIZED HEALTH CARE EDUCATION/TRAINING PROGRAM

## 2023-02-17 PROCEDURE — 1160F RVW MEDS BY RX/DR IN RCRD: CPT | Mod: CPTII,,, | Performed by: STUDENT IN AN ORGANIZED HEALTH CARE EDUCATION/TRAINING PROGRAM

## 2023-02-17 PROCEDURE — 1159F PR MEDICATION LIST DOCUMENTED IN MEDICAL RECORD: ICD-10-PCS | Mod: CPTII,,, | Performed by: STUDENT IN AN ORGANIZED HEALTH CARE EDUCATION/TRAINING PROGRAM

## 2023-02-17 PROCEDURE — 1159F MED LIST DOCD IN RCRD: CPT | Mod: CPTII,,, | Performed by: STUDENT IN AN ORGANIZED HEALTH CARE EDUCATION/TRAINING PROGRAM

## 2023-02-17 PROCEDURE — 99213 OFFICE O/P EST LOW 20 MIN: CPT | Mod: ,,, | Performed by: STUDENT IN AN ORGANIZED HEALTH CARE EDUCATION/TRAINING PROGRAM

## 2023-02-17 PROCEDURE — 3075F SYST BP GE 130 - 139MM HG: CPT | Mod: CPTII,,, | Performed by: STUDENT IN AN ORGANIZED HEALTH CARE EDUCATION/TRAINING PROGRAM

## 2023-02-17 PROCEDURE — 99213 PR OFFICE/OUTPT VISIT, EST, LEVL III, 20-29 MIN: ICD-10-PCS | Mod: ,,, | Performed by: STUDENT IN AN ORGANIZED HEALTH CARE EDUCATION/TRAINING PROGRAM

## 2023-02-17 PROCEDURE — 80048 BASIC METABOLIC PNL TOTAL CA: CPT | Mod: ,,, | Performed by: CLINICAL MEDICAL LABORATORY

## 2023-02-17 PROCEDURE — 3078F PR MOST RECENT DIASTOLIC BLOOD PRESSURE < 80 MM HG: ICD-10-PCS | Mod: CPTII,,, | Performed by: STUDENT IN AN ORGANIZED HEALTH CARE EDUCATION/TRAINING PROGRAM

## 2023-02-17 PROCEDURE — 80048 BASIC METABOLIC PANEL: ICD-10-PCS | Mod: ,,, | Performed by: CLINICAL MEDICAL LABORATORY

## 2023-02-17 PROCEDURE — 96127 PR BRIEF EMOTIONAL/BEHAV ASSMT: ICD-10-PCS | Mod: ,,, | Performed by: STUDENT IN AN ORGANIZED HEALTH CARE EDUCATION/TRAINING PROGRAM

## 2023-02-17 PROCEDURE — 3008F BODY MASS INDEX DOCD: CPT | Mod: CPTII,,, | Performed by: STUDENT IN AN ORGANIZED HEALTH CARE EDUCATION/TRAINING PROGRAM

## 2023-02-17 PROCEDURE — 96127 BRIEF EMOTIONAL/BEHAV ASSMT: CPT | Mod: ,,, | Performed by: STUDENT IN AN ORGANIZED HEALTH CARE EDUCATION/TRAINING PROGRAM

## 2023-02-17 RX ORDER — HYDROCHLOROTHIAZIDE 25 MG/1
25 TABLET ORAL DAILY
Qty: 30 TABLET | Refills: 2 | Status: SHIPPED | OUTPATIENT
Start: 2023-02-17 | End: 2023-03-31 | Stop reason: SDUPTHER

## 2023-02-21 PROBLEM — F32.2 SEVERE DEPRESSION: Status: ACTIVE | Noted: 2023-02-21

## 2023-02-21 NOTE — ASSESSMENT & PLAN NOTE
At goal on current regimen. continue current medical regimen; repeat BMP to monitor kidney function and electrolytes

## 2023-02-21 NOTE — ASSESSMENT & PLAN NOTE
Severe depression as per PHQ-9; urgent  referral to [facility] placed to discuss pharmacotherapy / other treatment modalities. She expressed understanding and agreement with this plan. Encouraged her to call me immediately if she has any problems or getting seen in a timely manner.

## 2023-02-21 NOTE — PROGRESS NOTES
"Subjective:       Patient ID: Mateus Ballesteros is a 37 y.o. female.    Chief Complaint: Anxiety    HPI    37-year-old woman with the medical problems listed below who comes to clinic   for refill of their medication and follow-up for their chronic medical conditions. PHQ-9 screening performed today also indicates severe depression.       Current Outpatient Medications:     hydroCHLOROthiazide (HYDRODIURIL) 25 MG tablet, Take 1 tablet (25 mg total) by mouth once daily., Disp: 30 tablet, Rfl: 2        Objective:    /78   Pulse 74   Temp 97.8 °F (36.6 °C) (Oral)   Ht 5' 7" (1.702 m)   Wt 129.3 kg (285 lb)   SpO2 100%   BMI 44.64 kg/m²     Physical Exam    Gen: well-groomed, well-dressed. No apparent distress  HEENT:  NCAT, symmetric  Pulm: normal work of breathing, no accessory muscle use; speaking complete sentences without having to stop  Neuro: CN II-XII grossly normal   Psych: tearful affect, congruent mood. Linear thought; good eye contact  SKIN: no rashes present on face, neck, hands    PHQ-9 Depression Patient Health Questionnaire 2/17/2023   Over the last two weeks how often have you been bothered by little interest or pleasure in doing things 3   Over the last two weeks how often have you been bothered by feeling down, depressed or hopeless 3   Over the last two weeks how often have you been bothered by trouble falling or staying asleep, or sleeping too much 3   Over the last two weeks how often have you been bothered by feeling tired or having little energy 3   Over the last two weeks how often have you been bothered by a poor appetite or overeating 3   Over the last two weeks how often have you been bothered by feeling bad about yourself - or that you are a failure or have let yourself or your family down 3   Over the last two weeks how often have you been bothered by trouble concentrating on things, such as reading the newspaper or watching television 3   Over the last two weeks how often have you " been bothered by moving or speaking so slowly that other people could have noticed. 0   Over the last two weeks how often have you been bothered by thoughts that you would be better off dead, or of hurting yourself 0   If you checked off any problems, how difficult have these problems made it for you to do your work, take care of things at home or get along with other people? (No Data)   Total Score 21       Assessment / Plan:        Problem List Items Addressed This Visit       Essential hypertension     At goal on current regimen. continue current medical regimen; repeat BMP to monitor kidney function and electrolytes         Relevant Medications    hydroCHLOROthiazide (HYDRODIURIL) 25 MG tablet    Other Relevant Orders    Basic Metabolic Panel (Completed)    Severe depression - Primary     Severe depression as per PHQ-9; urgent  referral to [facility] placed to discuss pharmacotherapy / other treatment modalities. She expressed understanding and agreement with this plan. Encouraged her to call me immediately if she has any problems or getting seen in a timely manner.           Relevant Orders    Ambulatory referral/consult to Behavioral Health       F/u 6 weeks for mood and BP

## 2023-03-21 ENCOUNTER — PATIENT OUTREACH (OUTPATIENT)
Dept: ADMINISTRATIVE | Facility: HOSPITAL | Age: 38
End: 2023-03-21

## 2023-03-31 ENCOUNTER — OFFICE VISIT (OUTPATIENT)
Dept: FAMILY MEDICINE | Facility: CLINIC | Age: 38
End: 2023-03-31
Payer: MEDICAID

## 2023-03-31 VITALS
HEART RATE: 95 BPM | OXYGEN SATURATION: 97 % | TEMPERATURE: 99 F | SYSTOLIC BLOOD PRESSURE: 123 MMHG | RESPIRATION RATE: 18 BRPM | DIASTOLIC BLOOD PRESSURE: 84 MMHG | HEIGHT: 67 IN | BODY MASS INDEX: 44.61 KG/M2 | WEIGHT: 284.19 LBS

## 2023-03-31 DIAGNOSIS — I10 ESSENTIAL HYPERTENSION: ICD-10-CM

## 2023-03-31 DIAGNOSIS — F32.2 SEVERE DEPRESSION: Primary | ICD-10-CM

## 2023-03-31 PROBLEM — D69.3 CHRONIC ITP (IDIOPATHIC THROMBOCYTOPENIA): Status: ACTIVE | Noted: 2018-01-25

## 2023-03-31 PROBLEM — B18.1 HEPATITIS B CARRIER: Status: ACTIVE | Noted: 2018-01-25

## 2023-03-31 PROBLEM — N18.1 CKD (CHRONIC KIDNEY DISEASE) STAGE 1, GFR 90 ML/MIN OR GREATER: Status: ACTIVE | Noted: 2018-06-05

## 2023-03-31 PROBLEM — N04.9 NEPHROTIC SYNDROME: Status: ACTIVE | Noted: 2017-11-28

## 2023-03-31 PROBLEM — E78.2 MIXED HYPERLIPIDEMIA: Status: ACTIVE | Noted: 2017-11-24

## 2023-03-31 PROBLEM — D64.9 ANEMIA: Status: ACTIVE | Noted: 2017-11-23

## 2023-03-31 PROCEDURE — 99213 OFFICE O/P EST LOW 20 MIN: CPT | Mod: ,,, | Performed by: STUDENT IN AN ORGANIZED HEALTH CARE EDUCATION/TRAINING PROGRAM

## 2023-03-31 PROCEDURE — 3079F DIAST BP 80-89 MM HG: CPT | Mod: CPTII,,, | Performed by: STUDENT IN AN ORGANIZED HEALTH CARE EDUCATION/TRAINING PROGRAM

## 2023-03-31 PROCEDURE — 3008F PR BODY MASS INDEX (BMI) DOCUMENTED: ICD-10-PCS | Mod: CPTII,,, | Performed by: STUDENT IN AN ORGANIZED HEALTH CARE EDUCATION/TRAINING PROGRAM

## 2023-03-31 PROCEDURE — 3008F BODY MASS INDEX DOCD: CPT | Mod: CPTII,,, | Performed by: STUDENT IN AN ORGANIZED HEALTH CARE EDUCATION/TRAINING PROGRAM

## 2023-03-31 PROCEDURE — 1160F RVW MEDS BY RX/DR IN RCRD: CPT | Mod: CPTII,,, | Performed by: STUDENT IN AN ORGANIZED HEALTH CARE EDUCATION/TRAINING PROGRAM

## 2023-03-31 PROCEDURE — 3079F PR MOST RECENT DIASTOLIC BLOOD PRESSURE 80-89 MM HG: ICD-10-PCS | Mod: CPTII,,, | Performed by: STUDENT IN AN ORGANIZED HEALTH CARE EDUCATION/TRAINING PROGRAM

## 2023-03-31 PROCEDURE — 3074F PR MOST RECENT SYSTOLIC BLOOD PRESSURE < 130 MM HG: ICD-10-PCS | Mod: CPTII,,, | Performed by: STUDENT IN AN ORGANIZED HEALTH CARE EDUCATION/TRAINING PROGRAM

## 2023-03-31 PROCEDURE — 1159F PR MEDICATION LIST DOCUMENTED IN MEDICAL RECORD: ICD-10-PCS | Mod: CPTII,,, | Performed by: STUDENT IN AN ORGANIZED HEALTH CARE EDUCATION/TRAINING PROGRAM

## 2023-03-31 PROCEDURE — 3074F SYST BP LT 130 MM HG: CPT | Mod: CPTII,,, | Performed by: STUDENT IN AN ORGANIZED HEALTH CARE EDUCATION/TRAINING PROGRAM

## 2023-03-31 PROCEDURE — 1159F MED LIST DOCD IN RCRD: CPT | Mod: CPTII,,, | Performed by: STUDENT IN AN ORGANIZED HEALTH CARE EDUCATION/TRAINING PROGRAM

## 2023-03-31 PROCEDURE — 99213 PR OFFICE/OUTPT VISIT, EST, LEVL III, 20-29 MIN: ICD-10-PCS | Mod: ,,, | Performed by: STUDENT IN AN ORGANIZED HEALTH CARE EDUCATION/TRAINING PROGRAM

## 2023-03-31 PROCEDURE — 1160F PR REVIEW ALL MEDS BY PRESCRIBER/CLIN PHARMACIST DOCUMENTED: ICD-10-PCS | Mod: CPTII,,, | Performed by: STUDENT IN AN ORGANIZED HEALTH CARE EDUCATION/TRAINING PROGRAM

## 2023-03-31 RX ORDER — HYDROCHLOROTHIAZIDE 25 MG/1
25 TABLET ORAL DAILY
Qty: 30 TABLET | Refills: 2 | Status: SHIPPED | OUTPATIENT
Start: 2023-03-31 | End: 2023-03-31 | Stop reason: SDUPTHER

## 2023-03-31 RX ORDER — LOSARTAN POTASSIUM 50 MG/1
TABLET ORAL
Qty: 30 TABLET | Refills: 2 | Status: SHIPPED | OUTPATIENT
Start: 2023-03-31 | End: 2023-12-13 | Stop reason: SDUPTHER

## 2023-03-31 RX ORDER — HYDROCHLOROTHIAZIDE 25 MG/1
25 TABLET ORAL DAILY
Qty: 30 TABLET | Refills: 2 | Status: SHIPPED | OUTPATIENT
Start: 2023-03-31 | End: 2023-08-14 | Stop reason: SDUPTHER

## 2023-03-31 RX ORDER — LOSARTAN POTASSIUM 50 MG/1
TABLET ORAL
COMMUNITY
End: 2023-03-31 | Stop reason: SDUPTHER

## 2023-03-31 NOTE — ASSESSMENT & PLAN NOTE
Seems to improve spontaneously; did not follow-up with [facility] as recommended. She states her mood has stabilized. Monitor closely; advised her to notify if things get worse.

## 2023-03-31 NOTE — PROGRESS NOTES
"Subjective     Patient ID: Mateus Ballesteros is a 38 y.o. female.    Chief Complaint: Medication Refill    HPI    38-year-old woman with the medical problems listed below who comes to clinic for refill of their medication and follow-up for their chronic medical conditions. She has not been checking BP at home. Well-controlled in clinic today.    Reports her mood is fine. She did not follow-up at [facility]. Reports that last visit she was in distress because her cousin had .       Reports PMH PE; history of CHF; blood transfusion.   Was going to [facility]. Had signs of lupus per them        Objective   /84 (BP Location: Left arm, Patient Position: Sitting, BP Method: Large (Automatic))   Pulse 95   Temp 98.8 °F (37.1 °C) (Oral)   Resp 18   Ht 5' 7" (1.702 m)   Wt 128.9 kg (284 lb 3.2 oz)   SpO2 97%   BMI 44.51 kg/m²     Physical Exam  Gen: well-groomed, well-dressed. No apparent distress  HEENT:  NCAT, symmetric  Pulm: normal work of breathing, no accessory muscle use; speaking complete sentences without having to stop  Neuro: CN II-XII grossly normal   Psych: pleasant affect, congruent mood. Linear thought; good eye contact  SKIN: no rashes present on face, neck, hands       Assessment and Plan     Problem List Items Addressed This Visit       Essential hypertension     - continue losartan 50 mg daily  - continue HCTZ 25 mg daily  - Last BMP wnl; start monitoring at home.         Relevant Medications    hydroCHLOROthiazide (HYDRODIURIL) 25 MG tablet    losartan (COZAAR) 50 MG tablet    Severe depression - Primary     Seems to improve spontaneously; did not follow-up with [facility] as recommended. She states her mood has stabilized. Monitor closely; advised her to notify if things get worse.           "

## 2023-03-31 NOTE — ASSESSMENT & PLAN NOTE
The current medical regimen is effective;  continue present plan and medications.    - continue losartan 50 mg daily  - continue HCTZ 25 mg daily  - Last BMP wnl; start monitoring BP at home.

## 2023-06-02 ENCOUNTER — OFFICE VISIT (OUTPATIENT)
Dept: FAMILY MEDICINE | Facility: CLINIC | Age: 38
End: 2023-06-02
Payer: MEDICAID

## 2023-06-02 VITALS
OXYGEN SATURATION: 97 % | HEART RATE: 72 BPM | BODY MASS INDEX: 44.51 KG/M2 | HEIGHT: 67 IN | TEMPERATURE: 99 F | SYSTOLIC BLOOD PRESSURE: 124 MMHG | DIASTOLIC BLOOD PRESSURE: 73 MMHG

## 2023-06-02 DIAGNOSIS — R23.2 HOT FLASHES: ICD-10-CM

## 2023-06-02 DIAGNOSIS — T14.8XXA MUSCLE STRAIN: ICD-10-CM

## 2023-06-02 DIAGNOSIS — M54.2 NECK PAIN: Primary | ICD-10-CM

## 2023-06-02 DIAGNOSIS — L68.0 HIRSUTISM: ICD-10-CM

## 2023-06-02 PROCEDURE — 3074F PR MOST RECENT SYSTOLIC BLOOD PRESSURE < 130 MM HG: ICD-10-PCS | Mod: CPTII,,, | Performed by: STUDENT IN AN ORGANIZED HEALTH CARE EDUCATION/TRAINING PROGRAM

## 2023-06-02 PROCEDURE — 1160F RVW MEDS BY RX/DR IN RCRD: CPT | Mod: CPTII,,, | Performed by: STUDENT IN AN ORGANIZED HEALTH CARE EDUCATION/TRAINING PROGRAM

## 2023-06-02 PROCEDURE — 3078F PR MOST RECENT DIASTOLIC BLOOD PRESSURE < 80 MM HG: ICD-10-PCS | Mod: CPTII,,, | Performed by: STUDENT IN AN ORGANIZED HEALTH CARE EDUCATION/TRAINING PROGRAM

## 2023-06-02 PROCEDURE — 96372 THER/PROPH/DIAG INJ SC/IM: CPT | Mod: ,,, | Performed by: STUDENT IN AN ORGANIZED HEALTH CARE EDUCATION/TRAINING PROGRAM

## 2023-06-02 PROCEDURE — 1160F PR REVIEW ALL MEDS BY PRESCRIBER/CLIN PHARMACIST DOCUMENTED: ICD-10-PCS | Mod: CPTII,,, | Performed by: STUDENT IN AN ORGANIZED HEALTH CARE EDUCATION/TRAINING PROGRAM

## 2023-06-02 PROCEDURE — 3078F DIAST BP <80 MM HG: CPT | Mod: CPTII,,, | Performed by: STUDENT IN AN ORGANIZED HEALTH CARE EDUCATION/TRAINING PROGRAM

## 2023-06-02 PROCEDURE — 3008F BODY MASS INDEX DOCD: CPT | Mod: CPTII,,, | Performed by: STUDENT IN AN ORGANIZED HEALTH CARE EDUCATION/TRAINING PROGRAM

## 2023-06-02 PROCEDURE — 3008F PR BODY MASS INDEX (BMI) DOCUMENTED: ICD-10-PCS | Mod: CPTII,,, | Performed by: STUDENT IN AN ORGANIZED HEALTH CARE EDUCATION/TRAINING PROGRAM

## 2023-06-02 PROCEDURE — 1159F MED LIST DOCD IN RCRD: CPT | Mod: CPTII,,, | Performed by: STUDENT IN AN ORGANIZED HEALTH CARE EDUCATION/TRAINING PROGRAM

## 2023-06-02 PROCEDURE — 4010F PR ACE/ARB THEARPY RXD/TAKEN: ICD-10-PCS | Mod: CPTII,,, | Performed by: STUDENT IN AN ORGANIZED HEALTH CARE EDUCATION/TRAINING PROGRAM

## 2023-06-02 PROCEDURE — 99214 OFFICE O/P EST MOD 30 MIN: CPT | Mod: 25,,, | Performed by: STUDENT IN AN ORGANIZED HEALTH CARE EDUCATION/TRAINING PROGRAM

## 2023-06-02 PROCEDURE — 1159F PR MEDICATION LIST DOCUMENTED IN MEDICAL RECORD: ICD-10-PCS | Mod: CPTII,,, | Performed by: STUDENT IN AN ORGANIZED HEALTH CARE EDUCATION/TRAINING PROGRAM

## 2023-06-02 PROCEDURE — 4010F ACE/ARB THERAPY RXD/TAKEN: CPT | Mod: CPTII,,, | Performed by: STUDENT IN AN ORGANIZED HEALTH CARE EDUCATION/TRAINING PROGRAM

## 2023-06-02 PROCEDURE — 99214 PR OFFICE/OUTPT VISIT, EST, LEVL IV, 30-39 MIN: ICD-10-PCS | Mod: 25,,, | Performed by: STUDENT IN AN ORGANIZED HEALTH CARE EDUCATION/TRAINING PROGRAM

## 2023-06-02 PROCEDURE — 96372 PR INJECTION,THERAP/PROPH/DIAG2ST, IM OR SUBCUT: ICD-10-PCS | Mod: ,,, | Performed by: STUDENT IN AN ORGANIZED HEALTH CARE EDUCATION/TRAINING PROGRAM

## 2023-06-02 PROCEDURE — 3074F SYST BP LT 130 MM HG: CPT | Mod: CPTII,,, | Performed by: STUDENT IN AN ORGANIZED HEALTH CARE EDUCATION/TRAINING PROGRAM

## 2023-06-02 RX ORDER — CYCLOBENZAPRINE HCL 10 MG
10 TABLET ORAL NIGHTLY PRN
Qty: 10 TABLET | Refills: 0 | Status: SHIPPED | OUTPATIENT
Start: 2023-06-02 | End: 2023-06-12

## 2023-06-02 RX ORDER — KETOROLAC TROMETHAMINE 30 MG/ML
60 INJECTION, SOLUTION INTRAMUSCULAR; INTRAVENOUS
Status: COMPLETED | OUTPATIENT
Start: 2023-06-02 | End: 2023-06-02

## 2023-06-02 RX ORDER — MENTHOL, UNSPECIFIED FORM 50 MG/ML
1 GEL TOPICAL 3 TIMES DAILY PRN
Qty: 113.4 G | Refills: 2 | Status: SHIPPED | OUTPATIENT
Start: 2023-06-02 | End: 2024-06-01

## 2023-06-02 RX ORDER — DICLOFENAC SODIUM 10 MG/G
2 GEL TOPICAL 4 TIMES DAILY
Qty: 50 G | Refills: 2 | Status: SHIPPED | OUTPATIENT
Start: 2023-06-02 | End: 2023-12-13 | Stop reason: SDUPTHER

## 2023-06-02 RX ORDER — NAPROXEN SODIUM 220 MG
220 TABLET ORAL 2 TIMES DAILY WITH MEALS
Qty: 28 TABLET | Refills: 0 | Status: SHIPPED | OUTPATIENT
Start: 2023-06-02 | End: 2023-06-16

## 2023-06-02 RX ADMIN — KETOROLAC TROMETHAMINE 60 MG: 30 INJECTION, SOLUTION INTRAMUSCULAR; INTRAVENOUS at 10:06

## 2023-06-02 NOTE — PROGRESS NOTES
"Subjective     Patient ID: Mateus Ballesteros is a 38 y.o. female.    Chief Complaint: Back Pain (X 1 to 2 years )    HPI      38-year-old woman with the medical problems listed below who comes to clinic with acute on chronic neck pain and thoracic back pain. No neck stiffness neurologic s/s. Hasn't tried any OTC meds. Uses some massage devices which has helped. Reports prior imaging at [place] - Had an MRI and was sent to a specialist in [place]. Was seen by [facility]? Somewhere near [place]. Has previously seen chiropractor.      Objective   /73   Pulse 72   Temp 98.5 °F (36.9 °C) (Oral)   Ht 5' 7" (1.702 m)   LMP 05/04/2023 (Approximate)   SpO2 97%   BMI 44.51 kg/m²       Physical Exam  Gen: well-groomed, well-dressed. No apparent distress  HEENT:  NCAT, symmetric  Pulm: normal work of breathing, no accessory muscle use; speaking complete sentences without having to stop  Neuro: CN II-XII grossly normal   Psych: pleasant affect, congruent mood. Linear thought; good eye contact  SKIN: no rashes present on face, neck, hands       Assessment and Plan     Problem List Items Addressed This Visit    None  Visit Diagnoses       Neck pain    -  Primary    Relevant Medications    ketorolac injection 60 mg (Completed)    menthol (BIOFREEZE, MENTHOL,) 5 % Gel    diclofenac sodium (VOLTAREN) 1 % Gel    cyclobenzaprine (FLEXERIL) 10 MG tablet    naproxen sodium (ALEVE) 220 MG tablet    Muscle strain        Relevant Medications    ketorolac injection 60 mg (Completed)    menthol (BIOFREEZE, MENTHOL,) 5 % Gel    diclofenac sodium (VOLTAREN) 1 % Gel    cyclobenzaprine (FLEXERIL) 10 MG tablet    naproxen sodium (ALEVE) 220 MG tablet    Hirsutism        Relevant Orders    Ambulatory referral/consult to Gynecology    Hot flashes        Relevant Orders    Ambulatory referral/consult to Gynecology            Neck pain and cervical muscle strain:    - Use a heating pad  -  some biofreeze and use this several times a day  - " Take the Aleve every day for the next week and let me know how you are doing in about a week  - You can use Tylenol 1,000 mg in between doses of Aleve if your neck is still bothering you.  - In about a week, try to do some gentle range of motion exercises  - If you are not feeling better, let me know and we will talk about next steps.     Face to face encounter time 15 minutes.    Non face to face encounter time 15 minutes.  Reviewing separate obtained history, counseling and educating the patient, family and/or other caregivers, ordering medications, tests or procedures; referring and communicating with other health care professionals; documenting clinical information in the electronic medical record; care coordination; independently interpreting results and communicating results to the patient, family, and/or caregivers.    Total time for visit  30 minutes

## 2023-06-02 NOTE — PATIENT INSTRUCTIONS
- Use a heating pad  -  some biofreeze and use this several times a day  - Take the Aleve every day for the next week and let me know how you are doing in about a week  - You can use Tylenol 1,000 mg in between doses of Aleve if your neck is still bothering you.  - In about a week, try to do some gentle range of motion exercises  - If you are not feeling better, let me know and we will talk about next steps.

## 2023-08-14 DIAGNOSIS — I10 ESSENTIAL HYPERTENSION: ICD-10-CM

## 2023-08-14 RX ORDER — HYDROCHLOROTHIAZIDE 25 MG/1
25 TABLET ORAL DAILY
Qty: 30 TABLET | Refills: 2 | Status: SHIPPED | OUTPATIENT
Start: 2023-08-14 | End: 2023-12-13 | Stop reason: SDUPTHER

## 2023-09-29 ENCOUNTER — OFFICE VISIT (OUTPATIENT)
Dept: FAMILY MEDICINE | Facility: CLINIC | Age: 38
End: 2023-09-29
Payer: MEDICAID

## 2023-09-29 VITALS
OXYGEN SATURATION: 97 % | HEART RATE: 80 BPM | HEIGHT: 67 IN | SYSTOLIC BLOOD PRESSURE: 144 MMHG | BODY MASS INDEX: 43.76 KG/M2 | TEMPERATURE: 99 F | WEIGHT: 278.81 LBS | DIASTOLIC BLOOD PRESSURE: 82 MMHG

## 2023-09-29 DIAGNOSIS — H92.03 ACUTE EAR PAIN, BILATERAL: Primary | ICD-10-CM

## 2023-09-29 PROCEDURE — 3079F DIAST BP 80-89 MM HG: CPT | Mod: CPTII,,, | Performed by: FAMILY MEDICINE

## 2023-09-29 PROCEDURE — 3079F PR MOST RECENT DIASTOLIC BLOOD PRESSURE 80-89 MM HG: ICD-10-PCS | Mod: CPTII,,, | Performed by: FAMILY MEDICINE

## 2023-09-29 PROCEDURE — 3077F SYST BP >= 140 MM HG: CPT | Mod: CPTII,,, | Performed by: FAMILY MEDICINE

## 2023-09-29 PROCEDURE — 4010F ACE/ARB THERAPY RXD/TAKEN: CPT | Mod: CPTII,,, | Performed by: FAMILY MEDICINE

## 2023-09-29 PROCEDURE — 3077F PR MOST RECENT SYSTOLIC BLOOD PRESSURE >= 140 MM HG: ICD-10-PCS | Mod: CPTII,,, | Performed by: FAMILY MEDICINE

## 2023-09-29 PROCEDURE — 4010F PR ACE/ARB THEARPY RXD/TAKEN: ICD-10-PCS | Mod: CPTII,,, | Performed by: FAMILY MEDICINE

## 2023-09-29 PROCEDURE — 1159F PR MEDICATION LIST DOCUMENTED IN MEDICAL RECORD: ICD-10-PCS | Mod: CPTII,,, | Performed by: FAMILY MEDICINE

## 2023-09-29 PROCEDURE — 3008F PR BODY MASS INDEX (BMI) DOCUMENTED: ICD-10-PCS | Mod: CPTII,,, | Performed by: FAMILY MEDICINE

## 2023-09-29 PROCEDURE — 99213 OFFICE O/P EST LOW 20 MIN: CPT | Mod: ,,, | Performed by: FAMILY MEDICINE

## 2023-09-29 PROCEDURE — 99213 PR OFFICE/OUTPT VISIT, EST, LEVL III, 20-29 MIN: ICD-10-PCS | Mod: ,,, | Performed by: FAMILY MEDICINE

## 2023-09-29 PROCEDURE — 1159F MED LIST DOCD IN RCRD: CPT | Mod: CPTII,,, | Performed by: FAMILY MEDICINE

## 2023-09-29 PROCEDURE — 3008F BODY MASS INDEX DOCD: CPT | Mod: CPTII,,, | Performed by: FAMILY MEDICINE

## 2023-09-29 NOTE — PROGRESS NOTES
Luisa Joel DO        PATIENT NAME: Mateus Ballesteros  : 1985  DATE: 23  MRN: [id number]      Reason for Visit / Chief Complaint: Otalgia (Patient having ear pain since Saturday.  The left hurts more than the right.)     History of Present Illness / Problem Focused Workflow     Mateus Ballesteros presents to the clinic with Otalgia (Patient having ear pain since Saturday.  The left hurts more than the right.)     Presents with bilateral earache which started on    Has tried some OTC ear drops and peroxide which has not helped   Associated with headache. Denies any hearing loss, fever, chills, nasal congestion, or cough   Having left sided jaw pain as well which is intermittent and achy in character       Review of Systems     Review of Systems   Constitutional:  Negative for chills and fever.   HENT:  Positive for ear pain and rhinorrhea.    Eyes:  Negative for pain.   Respiratory:  Negative for shortness of breath.    Cardiovascular:  Negative for chest pain.   Gastrointestinal:  Negative for abdominal pain, nausea and vomiting.   Neurological:  Positive for headaches. Negative for dizziness and light-headedness.        Medical / Social / Family History     Past Medical History:   Diagnosis Date    CHF (congestive heart failure)     Hypertension        No past surgical history on file.    Social History  Ms. Mateus Ballesteros  reports that she has quit smoking. Her smoking use included cigars. She has never used smokeless tobacco. She reports current alcohol use. She reports that she does not use drugs.    Family History  Ms. Mateus Ballesteros's family history is not on file.    Medications and Allergies     Medications  Outpatient Medications Marked as Taking for the 23 encounter (Office Visit) with Luisa Joel DO   Medication Sig Dispense Refill    diclofenac sodium (VOLTAREN) 1 % Gel Apply 2 g topically 4 (four) times daily. 50 g 2    hydroCHLOROthiazide (HYDRODIURIL) 25 MG tablet  "Take 1 tablet (25 mg total) by mouth once daily. 30 tablet 2    losartan (COZAAR) 50 MG tablet TAKE ONE TABLET BY MOUTH ONCE A DAY- Do not take if you may become pregnant 30 tablet 2       Allergies  Review of patient's allergies indicates:   Allergen Reactions    Penicillins Nausea And Vomiting     Per pt made her "foam at the mouth"      Sulfa (sulfonamide antibiotics) Rash       Physical Examination     Vitals:    09/29/23 1518   BP: (!) 144/82   Pulse: 80   Temp: 99 °F (37.2 °C)     Physical Exam  Constitutional:       General: She is not in acute distress.     Appearance: Normal appearance.   HENT:      Head: Normocephalic and atraumatic.      Jaw: No tenderness, swelling or pain on movement.      Comments: No temporal tenderness or TMJ tenderness.   Mild improvement in earache with mild traction of pinna     Right Ear: Tympanic membrane, ear canal and external ear normal. There is no impacted cerumen.      Left Ear: Tympanic membrane, ear canal and external ear normal. There is no impacted cerumen.   Cardiovascular:      Rate and Rhythm: Normal rate and regular rhythm.      Pulses: Normal pulses.      Heart sounds: No murmur heard.  Pulmonary:      Effort: Pulmonary effort is normal.      Breath sounds: Normal breath sounds. No wheezing, rhonchi or rales.   Neurological:      Mental Status: She is alert.       Assessment and Plan (including Health Maintenance)      Problem List  Smart Sets  Document Outside HM     Plan:   Acute ear pain, bilateral  - DDx include TMJ dysfunction vs. Referred pain from dental caries vs. Allergic ear effusion   - Recommend taking antihistamine medication like Zyrtec or Claritin once daily for at least two weeks   - Will also provided exercises for TMJ dysfunction   - Would recommend follow up with her dentist as well   - Follow up in 2 weeks      Health Maintenance Due   Topic Date Due    Hepatitis C Screening  Never done    Lipid Panel  Never done    COVID-19 Vaccine (1) Never " done    Pneumococcal Vaccines (Age 0-64) (1 - PCV) Never done    Hemoglobin A1c (Diabetic Prevention Screening)  Never done    Influenza Vaccine (1) Never done    Cervical Cancer Screening  12/04/2023       Problem List Items Addressed This Visit    None  Visit Diagnoses       Acute ear pain, bilateral    -  Primary            Health Maintenance Topics with due status: Not Due       Topic Last Completion Date    TETANUS VACCINE 03/31/2014       Signature:  Luisa Joel DO      Date of encounter: 9/29/23

## 2023-12-13 ENCOUNTER — OFFICE VISIT (OUTPATIENT)
Dept: FAMILY MEDICINE | Facility: CLINIC | Age: 38
End: 2023-12-13
Payer: MEDICAID

## 2023-12-13 VITALS
TEMPERATURE: 98 F | SYSTOLIC BLOOD PRESSURE: 119 MMHG | HEIGHT: 67 IN | OXYGEN SATURATION: 97 % | BODY MASS INDEX: 43.63 KG/M2 | WEIGHT: 278 LBS | HEART RATE: 77 BPM | DIASTOLIC BLOOD PRESSURE: 75 MMHG

## 2023-12-13 DIAGNOSIS — T14.8XXA MUSCLE STRAIN: ICD-10-CM

## 2023-12-13 DIAGNOSIS — I10 ESSENTIAL HYPERTENSION: ICD-10-CM

## 2023-12-13 DIAGNOSIS — M54.2 NECK PAIN: ICD-10-CM

## 2023-12-13 DIAGNOSIS — M50.90 CERVICAL DISC DISEASE: Primary | ICD-10-CM

## 2023-12-13 PROCEDURE — 3078F DIAST BP <80 MM HG: CPT | Mod: CPTII,,, | Performed by: STUDENT IN AN ORGANIZED HEALTH CARE EDUCATION/TRAINING PROGRAM

## 2023-12-13 PROCEDURE — 1159F MED LIST DOCD IN RCRD: CPT | Mod: CPTII,,, | Performed by: STUDENT IN AN ORGANIZED HEALTH CARE EDUCATION/TRAINING PROGRAM

## 2023-12-13 PROCEDURE — 99213 OFFICE O/P EST LOW 20 MIN: CPT | Mod: ,,, | Performed by: STUDENT IN AN ORGANIZED HEALTH CARE EDUCATION/TRAINING PROGRAM

## 2023-12-13 PROCEDURE — 4010F PR ACE/ARB THEARPY RXD/TAKEN: ICD-10-PCS | Mod: CPTII,,, | Performed by: STUDENT IN AN ORGANIZED HEALTH CARE EDUCATION/TRAINING PROGRAM

## 2023-12-13 PROCEDURE — 4010F ACE/ARB THERAPY RXD/TAKEN: CPT | Mod: CPTII,,, | Performed by: STUDENT IN AN ORGANIZED HEALTH CARE EDUCATION/TRAINING PROGRAM

## 2023-12-13 PROCEDURE — 3078F PR MOST RECENT DIASTOLIC BLOOD PRESSURE < 80 MM HG: ICD-10-PCS | Mod: CPTII,,, | Performed by: STUDENT IN AN ORGANIZED HEALTH CARE EDUCATION/TRAINING PROGRAM

## 2023-12-13 PROCEDURE — 3008F PR BODY MASS INDEX (BMI) DOCUMENTED: ICD-10-PCS | Mod: CPTII,,, | Performed by: STUDENT IN AN ORGANIZED HEALTH CARE EDUCATION/TRAINING PROGRAM

## 2023-12-13 PROCEDURE — 1159F PR MEDICATION LIST DOCUMENTED IN MEDICAL RECORD: ICD-10-PCS | Mod: CPTII,,, | Performed by: STUDENT IN AN ORGANIZED HEALTH CARE EDUCATION/TRAINING PROGRAM

## 2023-12-13 PROCEDURE — 99213 PR OFFICE/OUTPT VISIT, EST, LEVL III, 20-29 MIN: ICD-10-PCS | Mod: ,,, | Performed by: STUDENT IN AN ORGANIZED HEALTH CARE EDUCATION/TRAINING PROGRAM

## 2023-12-13 PROCEDURE — 1160F PR REVIEW ALL MEDS BY PRESCRIBER/CLIN PHARMACIST DOCUMENTED: ICD-10-PCS | Mod: CPTII,,, | Performed by: STUDENT IN AN ORGANIZED HEALTH CARE EDUCATION/TRAINING PROGRAM

## 2023-12-13 PROCEDURE — 3074F PR MOST RECENT SYSTOLIC BLOOD PRESSURE < 130 MM HG: ICD-10-PCS | Mod: CPTII,,, | Performed by: STUDENT IN AN ORGANIZED HEALTH CARE EDUCATION/TRAINING PROGRAM

## 2023-12-13 PROCEDURE — 1160F RVW MEDS BY RX/DR IN RCRD: CPT | Mod: CPTII,,, | Performed by: STUDENT IN AN ORGANIZED HEALTH CARE EDUCATION/TRAINING PROGRAM

## 2023-12-13 PROCEDURE — 3008F BODY MASS INDEX DOCD: CPT | Mod: CPTII,,, | Performed by: STUDENT IN AN ORGANIZED HEALTH CARE EDUCATION/TRAINING PROGRAM

## 2023-12-13 PROCEDURE — 3074F SYST BP LT 130 MM HG: CPT | Mod: CPTII,,, | Performed by: STUDENT IN AN ORGANIZED HEALTH CARE EDUCATION/TRAINING PROGRAM

## 2023-12-13 RX ORDER — DICLOFENAC SODIUM 10 MG/G
2 GEL TOPICAL 4 TIMES DAILY
Qty: 50 G | Refills: 2 | Status: SHIPPED | OUTPATIENT
Start: 2023-12-13

## 2023-12-13 RX ORDER — HYDROCHLOROTHIAZIDE 25 MG/1
25 TABLET ORAL DAILY
Qty: 30 TABLET | Refills: 2 | Status: SHIPPED | OUTPATIENT
Start: 2023-12-13 | End: 2024-12-12

## 2023-12-13 RX ORDER — LOSARTAN POTASSIUM 50 MG/1
TABLET ORAL
Qty: 30 TABLET | Refills: 2 | Status: SHIPPED | OUTPATIENT
Start: 2023-12-13

## 2023-12-13 NOTE — PROGRESS NOTES
"Subjective     Patient ID: Mateus Ballesteros is a 38 y.o. female.    Chief Complaint: Medication Refill and Referral (Never heard from GYN referral placed in June.)    HPI    Mateus Ballesteros is a 38 y.o. patient with the medical problems listed below who comes to clinic  for refill of their medication and follow-up for their chronic medical conditions. She is feeling well with few concerns. Reports adherence to all medication and denies any adverse effects from current medication.       Objective   /75   Pulse 77   Temp 98.2 °F (36.8 °C)   Ht 5' 7" (1.702 m)   Wt 126.1 kg (278 lb)   SpO2 97%   BMI 43.54 kg/m²     Physical Exam  Gen: well-groomed, well-dressed. No apparent distress  HEENT:  NCAT, symmetric  Pulm: normal work of breathing, no accessory muscle use; speaking complete sentences without having to stop  Neuro: CN II-XII grossly normal   Psych: pleasant affect, congruent mood. Linear thought; good eye contact  SKIN: no rashes present on face, neck, hands       Assessment and Plan     Problem List Items Addressed This Visit       Essential hypertension    Relevant Medications    hydroCHLOROthiazide (HYDRODIURIL) 25 MG tablet    losartan (COZAAR) 50 MG tablet     Other Visit Diagnoses       Cervical disc disease    -  Primary    Relevant Orders    Ambulatory referral/consult to Physical/Occupational Therapy    Neck pain        Relevant Medications    diclofenac sodium (VOLTAREN) 1 % Gel    Other Relevant Orders    Ambulatory referral/consult to Physical/Occupational Therapy    Muscle strain        Relevant Medications    diclofenac sodium (VOLTAREN) 1 % Gel          Chronic conditions doing ok; The current medical regimen is effective;  continue present plan and medications. F/u prn. Bethany confirmed referrals team will be working on GYN referral - Will ask pt to call us if she still does not hear anything.            "

## 2024-03-18 ENCOUNTER — OFFICE VISIT (OUTPATIENT)
Dept: FAMILY MEDICINE | Facility: CLINIC | Age: 39
End: 2024-03-18
Payer: MEDICAID

## 2024-03-18 VITALS
TEMPERATURE: 98 F | BODY MASS INDEX: 43.32 KG/M2 | SYSTOLIC BLOOD PRESSURE: 132 MMHG | OXYGEN SATURATION: 100 % | DIASTOLIC BLOOD PRESSURE: 77 MMHG | WEIGHT: 276 LBS | HEART RATE: 67 BPM | HEIGHT: 67 IN

## 2024-03-18 DIAGNOSIS — Z91.199 NON COMPLIANCE WITH MEDICAL TREATMENT: ICD-10-CM

## 2024-03-18 DIAGNOSIS — I10 ESSENTIAL HYPERTENSION: Primary | ICD-10-CM

## 2024-03-18 DIAGNOSIS — M25.571 RIGHT ANKLE PAIN, UNSPECIFIED CHRONICITY: ICD-10-CM

## 2024-03-18 DIAGNOSIS — F32.2 SEVERE DEPRESSION: ICD-10-CM

## 2024-03-18 PROCEDURE — 1160F RVW MEDS BY RX/DR IN RCRD: CPT | Mod: CPTII,,, | Performed by: STUDENT IN AN ORGANIZED HEALTH CARE EDUCATION/TRAINING PROGRAM

## 2024-03-18 PROCEDURE — 1159F MED LIST DOCD IN RCRD: CPT | Mod: CPTII,,, | Performed by: STUDENT IN AN ORGANIZED HEALTH CARE EDUCATION/TRAINING PROGRAM

## 2024-03-18 PROCEDURE — 99213 OFFICE O/P EST LOW 20 MIN: CPT | Mod: ,,, | Performed by: STUDENT IN AN ORGANIZED HEALTH CARE EDUCATION/TRAINING PROGRAM

## 2024-03-18 PROCEDURE — 3008F BODY MASS INDEX DOCD: CPT | Mod: CPTII,,, | Performed by: STUDENT IN AN ORGANIZED HEALTH CARE EDUCATION/TRAINING PROGRAM

## 2024-03-18 PROCEDURE — 3075F SYST BP GE 130 - 139MM HG: CPT | Mod: CPTII,,, | Performed by: STUDENT IN AN ORGANIZED HEALTH CARE EDUCATION/TRAINING PROGRAM

## 2024-03-18 PROCEDURE — 3078F DIAST BP <80 MM HG: CPT | Mod: CPTII,,, | Performed by: STUDENT IN AN ORGANIZED HEALTH CARE EDUCATION/TRAINING PROGRAM

## 2024-03-18 NOTE — PATIENT INSTRUCTIONS
Try to figure out where you were diagnosed with heart failure. We need to get you back in to see that doctor, or we can place a whole new referral if necessary. Give our office a call in the next few days to let us know what you would like to do.    For now, continue the hydrochlorothiazide only. Monitor your blood pressure daily and write it down. Bring your blood pressure log with you to your next appointment. Your goal is less than 130/80.    Do these exercises to help with your ankle pain. You can wear compression socks, and over the counter ankle brace, or an ace bandage when you are on your feet. If you are not a whole lot better after doing this in 4-6 weeks, we may need to get you to physical therapy.     Call [facility] to schedule your appointment for anxiety and depression. Your referral should still be active. If you have any trouble with this, call our office and we will do our best to get you connected.

## 2024-03-18 NOTE — PROGRESS NOTES
"Subjective     Patient ID: Mateus Ballesteros is a 38 y.o. female.    Chief Complaint: Hypertension (3 month followup; needs med refill), Joint Swelling (Pt fell the first of February and states ankles have been swelling and hurting since the fall; went  to ER and had negative xray), and Chest Pain (Pt reports having chest pain 3 days last week but has resolved now; thinks it may have been anxiety)    HPI    Mateus Ballesteros is a 38 y.o. patient with the medical problems listed below who comes to clinic for refill of their medication and follow-up for their chronic medical conditions.    Self-reported hx of CHF but no symptoms; is not on GDMT. Needs cards; declined PT for ankle; recommend HEP for now. Did not follow-up with [facility]. Non-adherent to ARB; is taking HCTZ.       Objective   /77   Pulse 67   Temp 98.1 °F (36.7 °C)   Ht 5' 7" (1.702 m)   Wt 125.2 kg (276 lb)   SpO2 100%   BMI 43.23 kg/m²     Wt Readings from Last 3 Encounters:   03/18/24 1523 125.2 kg (276 lb)   12/13/23 1608 126.1 kg (278 lb)   09/29/23 1518 126.5 kg (278 lb 12.8 oz)       Physical Exam  Gen: well-groomed, well-dressed. No apparent distress  HEENT:  NCAT, symmetric  Pulm: normal work of breathing, no accessory muscle use; speaking complete sentences without having to stop  Neuro: CN II-XII grossly normal   Psych: pleasant affect, congruent mood. Linear thought; good eye contact  SKIN: no rashes present on face, neck, hands       Assessment and Plan     Problem List Items Addressed This Visit       Essential hypertension - Primary    Severe depression    Non compliance with medical treatment    Right ankle pain       Try to figure out where you were diagnosed with heart failure. We need to get you back in to see that doctor, or we can place a whole new referral if necessary. Give our office a call in the next few days to let us know what you would like to do.    For now, continue the hydrochlorothiazide only. Monitor your blood pressure " daily and write it down. Bring your blood pressure log with you to your next appointment. Your goal is less than 130/80.    Do these exercises to help with your ankle pain. You can wear compression socks, and over the counter ankle brace, or an ace bandage when you are on your feet. If you are not a whole lot better after doing this in 4-6 weeks, we may need to get you to physical therapy.     Call [facility] to schedule your appointment for anxiety and depression. Your referral should still be active. If you have any trouble with this, call our office and we will do our best to get you connected.

## 2024-03-19 PROBLEM — M25.571 RIGHT ANKLE PAIN: Status: ACTIVE | Noted: 2024-03-19

## 2024-04-19 DIAGNOSIS — I10 ESSENTIAL HYPERTENSION: ICD-10-CM

## 2024-04-22 RX ORDER — HYDROCHLOROTHIAZIDE 25 MG/1
25 TABLET ORAL
Qty: 30 TABLET | Refills: 2 | Status: SHIPPED | OUTPATIENT
Start: 2024-04-22

## 2024-04-29 ENCOUNTER — OFFICE VISIT (OUTPATIENT)
Dept: FAMILY MEDICINE | Facility: CLINIC | Age: 39
End: 2024-04-29
Payer: MEDICAID

## 2024-04-29 VITALS
OXYGEN SATURATION: 97 % | DIASTOLIC BLOOD PRESSURE: 75 MMHG | HEIGHT: 67 IN | BODY MASS INDEX: 42.69 KG/M2 | WEIGHT: 272 LBS | TEMPERATURE: 98 F | HEART RATE: 82 BPM | SYSTOLIC BLOOD PRESSURE: 112 MMHG

## 2024-04-29 DIAGNOSIS — R22.1 MASS OF LEFT SIDE OF NECK: Primary | ICD-10-CM

## 2024-04-29 DIAGNOSIS — I10 ESSENTIAL HYPERTENSION: ICD-10-CM

## 2024-04-29 PROCEDURE — 3074F SYST BP LT 130 MM HG: CPT | Mod: CPTII,,, | Performed by: STUDENT IN AN ORGANIZED HEALTH CARE EDUCATION/TRAINING PROGRAM

## 2024-04-29 PROCEDURE — 1159F MED LIST DOCD IN RCRD: CPT | Mod: CPTII,,, | Performed by: STUDENT IN AN ORGANIZED HEALTH CARE EDUCATION/TRAINING PROGRAM

## 2024-04-29 PROCEDURE — 99214 OFFICE O/P EST MOD 30 MIN: CPT | Mod: ,,, | Performed by: STUDENT IN AN ORGANIZED HEALTH CARE EDUCATION/TRAINING PROGRAM

## 2024-04-29 PROCEDURE — 1160F RVW MEDS BY RX/DR IN RCRD: CPT | Mod: CPTII,,, | Performed by: STUDENT IN AN ORGANIZED HEALTH CARE EDUCATION/TRAINING PROGRAM

## 2024-04-29 PROCEDURE — 3008F BODY MASS INDEX DOCD: CPT | Mod: CPTII,,, | Performed by: STUDENT IN AN ORGANIZED HEALTH CARE EDUCATION/TRAINING PROGRAM

## 2024-04-29 PROCEDURE — 4010F ACE/ARB THERAPY RXD/TAKEN: CPT | Mod: CPTII,,, | Performed by: STUDENT IN AN ORGANIZED HEALTH CARE EDUCATION/TRAINING PROGRAM

## 2024-04-29 PROCEDURE — 3078F DIAST BP <80 MM HG: CPT | Mod: CPTII,,, | Performed by: STUDENT IN AN ORGANIZED HEALTH CARE EDUCATION/TRAINING PROGRAM

## 2024-04-29 NOTE — PATIENT INSTRUCTIONS
Your appointment for your ultrasound is tomorrow morning at 11 AM at [facility]      - Take Tylenol 1,000 mg every 8 hours  - You may take Advil 400 mg every 4 hours in between doses of Tylenol

## 2024-04-29 NOTE — PROGRESS NOTES
"Subjective     Patient ID: Mateus Ballesteros is a 39 y.o. female.    Chief Complaint: knot on neck (Left neck; knot appeared Saturday; states it's very painful/Also having headaches off and on; not sure if it's related to neck problem) and Referral (Needs to discuss referral to [facility])    HPI    DBP has been in the 100's recently.    Reports adherence to lisinopril / HCTZ.    No fever or chills. Recently tested for STI at the [facility] department.     Her main problem is a tender enlarged mass in the posterior triangle / supraclavicular area. It is tender to palpation.     No sore throat, nasal symptoms, dysphagia, or dyspnea. [place] Did not drink much alcohol. Has generally just not been feeling well, tired, fatigued, headaches. There is no overlying erythema. She has not noticed any other lumps or bumps.       Objective     /75   Pulse 82   Temp 98.1 °F (36.7 °C)   Ht 5' 7" (1.702 m)   Wt 123.4 kg (272 lb)   SpO2 97%   BMI 42.60 kg/m²     Wt Readings from Last 3 Encounters:   04/29/24 0858 123.4 kg (272 lb)   03/18/24 1523 125.2 kg (276 lb)   12/13/23 1608 126.1 kg (278 lb)       Physical Exam  Gen: well-groomed, well-dressed. No apparent distress but appears fatigues   HEENT:  NCAT, symmetric; lateral neck mass as per above   Pulm: normal work of breathing, no accessory muscle use; speaking complete sentences without having to stop  Neuro: CN II-XII grossly normal   Psych: pleasant affect, congruent mood. Linear thought; good eye contact  SKIN: no rashes present on face, neck, hands       Assessment and Plan     Problem List Items Addressed This Visit       Essential hypertension    Relevant Orders    Ambulatory referral/consult to Cardiology    Comprehensive Metabolic Panel     Other Visit Diagnoses       Mass of left side of neck    -  Primary    Relevant Orders    US Soft Tissue Head Neck    CT Soft Tissue Neck With Contrast    CBC Auto Differential "          HTN - BP normal today in clinic. Reports adherence to all medication. Recently has a DBP in the 100's at home and she felt ill at that time. Need to monitor this closely. TTE for evaluation of reported hx of CHF  Neck mass - posterior triangle neck mass in middle-aged woman. Do not suspect this infectious or post-viral reactive LAD; she required imaging evaluation and cannot tolerate US due to the pain and tenderness.      ADDENDUM    The mass is a 2-3 cm firm nodule in the supraclavicular region.

## 2024-05-01 ENCOUNTER — HOSPITAL ENCOUNTER (EMERGENCY)
Facility: HOSPITAL | Age: 39
Discharge: HOME | End: 2024-05-01
Payer: MEDICAID

## 2024-05-01 VITALS
WEIGHT: 272.81 LBS | SYSTOLIC BLOOD PRESSURE: 122 MMHG | HEIGHT: 67 IN | TEMPERATURE: 100 F | HEART RATE: 96 BPM | DIASTOLIC BLOOD PRESSURE: 71 MMHG | RESPIRATION RATE: 18 BRPM | OXYGEN SATURATION: 97 % | BODY MASS INDEX: 42.82 KG/M2

## 2024-05-01 DIAGNOSIS — R59.0 LYMPHADENOPATHY, SUPRACLAVICULAR: Primary | ICD-10-CM

## 2024-05-01 LAB
ALBUMIN SERPL BCP-MCNC: 3.4 G/DL (ref 3.5–5)
ALBUMIN/GLOB SERPL: 0.7 {RATIO}
ALP SERPL-CCNC: 83 U/L (ref 37–98)
ALT SERPL W P-5'-P-CCNC: 24 U/L (ref 13–56)
ANION GAP SERPL CALCULATED.3IONS-SCNC: 13 MMOL/L (ref 7–16)
AST SERPL W P-5'-P-CCNC: 21 U/L (ref 15–37)
BASOPHILS # BLD AUTO: 0.01 K/UL (ref 0–0.2)
BASOPHILS NFR BLD AUTO: 0.2 % (ref 0–1)
BILIRUB SERPL-MCNC: 0.5 MG/DL (ref ?–1.2)
BUN SERPL-MCNC: 10 MG/DL (ref 7–18)
BUN/CREAT SERPL: 9 (ref 6–20)
CALCIUM SERPL-MCNC: 9.4 MG/DL (ref 8.5–10.1)
CHLORIDE SERPL-SCNC: 98 MMOL/L (ref 98–107)
CO2 SERPL-SCNC: 28 MMOL/L (ref 21–32)
CREAT SERPL-MCNC: 1.09 MG/DL (ref 0.55–1.02)
DIFFERENTIAL METHOD BLD: ABNORMAL
EGFR (NO RACE VARIABLE) (RUSH/TITUS): 66 ML/MIN/1.73M2
EOSINOPHIL # BLD AUTO: 0.07 K/UL (ref 0–0.5)
EOSINOPHIL NFR BLD AUTO: 1.2 % (ref 1–4)
ERYTHROCYTE [DISTWIDTH] IN BLOOD BY AUTOMATED COUNT: 12.6 % (ref 11.5–14.5)
GLOBULIN SER-MCNC: 5.1 G/DL (ref 2–4)
GLUCOSE SERPL-MCNC: 105 MG/DL (ref 74–106)
HCT VFR BLD AUTO: 39.3 % (ref 38–47)
HGB BLD-MCNC: 12.9 G/DL (ref 12–16)
LYMPHOCYTES # BLD AUTO: 2.4 K/UL (ref 1–4.8)
LYMPHOCYTES NFR BLD AUTO: 41.3 % (ref 27–41)
MCH RBC QN AUTO: 30.1 PG (ref 27–31)
MCHC RBC AUTO-ENTMCNC: 32.8 G/DL (ref 32–36)
MCV RBC AUTO: 91.8 FL (ref 80–96)
MONOCYTES # BLD AUTO: 0.63 K/UL (ref 0–0.8)
MONOCYTES NFR BLD AUTO: 10.8 % (ref 2–6)
MPC BLD CALC-MCNC: 10.4 FL (ref 9.4–12.4)
NEUTROPHILS # BLD AUTO: 2.7 K/UL (ref 1.8–7.7)
NEUTROPHILS NFR BLD AUTO: 46.5 % (ref 53–65)
PLATELET # BLD AUTO: 280 K/UL (ref 150–400)
POTASSIUM SERPL-SCNC: 3.4 MMOL/L (ref 3.5–5.1)
PROT SERPL-MCNC: 8.5 G/DL (ref 6.4–8.2)
RBC # BLD AUTO: 4.28 M/UL (ref 4.2–5.4)
SODIUM SERPL-SCNC: 136 MMOL/L (ref 136–145)
WBC # BLD AUTO: 5.81 K/UL (ref 4.5–11)

## 2024-05-01 PROCEDURE — 99283 EMERGENCY DEPT VISIT LOW MDM: CPT

## 2024-05-01 PROCEDURE — 99283 EMERGENCY DEPT VISIT LOW MDM: CPT | Mod: ,,, | Performed by: NURSE PRACTITIONER

## 2024-05-01 PROCEDURE — 85025 COMPLETE CBC W/AUTO DIFF WBC: CPT | Performed by: NURSE PRACTITIONER

## 2024-05-01 PROCEDURE — 80053 COMPREHEN METABOLIC PANEL: CPT | Performed by: NURSE PRACTITIONER

## 2024-05-01 NOTE — ED TRIAGE NOTES
Presents to ed per pov c/o left neck pain and hypertension.Saw pcp Monday and is schedule for blood work and ultrasound in am.raes pain 10/10 pain scale. Took tylenol es at 1630.

## 2024-05-01 NOTE — ED PROVIDER NOTES
"Encounter Date: 5/1/2024       History     Chief Complaint   Patient presents with    Neck Pain    Hypertension     Painful left subclavicular mass.  Pt is scheduled for ultrasound tomorrow.  Seen by md and has ultrasound order    The history is provided by the patient.     Review of patient's allergies indicates:   Allergen Reactions    Penicillins Nausea And Vomiting     Per pt made her "foam at the mouth"      Sulfa (sulfonamide antibiotics) Rash     Past Medical History:   Diagnosis Date    CHF (congestive heart failure)     Hypertension      No past surgical history on file.  No family history on file.  Social History     Tobacco Use    Smoking status: Former     Types: Cigars    Smokeless tobacco: Never   Substance Use Topics    Alcohol use: Yes     Comment: occasional    Drug use: Never     Review of Systems   All other systems reviewed and are negative.      Physical Exam     Initial Vitals [05/01/24 1737]   BP Pulse Resp Temp SpO2   (!) 151/94 105 16 99.6 °F (37.6 °C) 97 %      MAP       --         Physical Exam    Constitutional: She appears well-developed and well-nourished.   HENT:   Head: Normocephalic.   Right Ear: External ear normal.   Left Ear: External ear normal.   Mouth/Throat: Oropharynx is clear and moist.   Eyes: Pupils are equal, round, and reactive to light.   Neck:   Left supraclavicular mass that is tender   Normal range of motion.  Cardiovascular:  Normal rate and regular rhythm.           Pulmonary/Chest: Breath sounds normal.   Musculoskeletal:         General: Normal range of motion.      Cervical back: Normal range of motion.     Lymphadenopathy:     She has cervical adenopathy.   Skin: Skin is warm.   Psychiatric: She has a normal mood and affect.         Medical Screening Exam   See Full Note    ED Course   Procedures  Labs Reviewed   COMPREHENSIVE METABOLIC PANEL - Abnormal; Notable for the following components:       Result Value    Potassium 3.4 (*)     Creatinine 1.09 (*)     " Total Protein 8.5 (*)     Albumin 3.4 (*)     Globulin 5.1 (*)     All other components within normal limits   CBC WITH DIFFERENTIAL - Abnormal; Notable for the following components:    Neutrophils % 46.5 (*)     Lymphocytes % 41.3 (*)     Monocytes % 10.8 (*)     All other components within normal limits   CBC W/ AUTO DIFFERENTIAL    Narrative:     The following orders were created for panel order CBC auto differential.  Procedure                               Abnormality         Status                     ---------                               -----------         ------                     CBC with Differential[[id number]]       Abnormal            Final result                 Please view results for these tests on the individual orders.          Imaging Results    None          Medications - No data to display  Medical Decision Making  Painful left subclavicular mass.  Pt is scheduled for ultrasound tomorrow.  Seen by md and has ultrasound order    Amount and/or Complexity of Data Reviewed  Labs: ordered.                                      Clinical Impression:   Final diagnoses:  [R59.0] Lymphadenopathy, supraclavicular (Primary)        ED Disposition Condition    Discharge Stable          ED Prescriptions    None       Follow-up Information       Follow up With Specialties Details Why Contact Info    Rigoberto Hannah MD Geriatric Medicine In 2 days  [address] [facility] [place] [address] [phone]          Pt will have ultrasound of mass as scheduled and follow up with pcp     Otto Haley, FNP  05/01/24 6097

## 2024-05-02 ENCOUNTER — HOSPITAL ENCOUNTER (OUTPATIENT)
Dept: RADIOLOGY | Facility: HOSPITAL | Age: 39
Discharge: HOME | End: 2024-05-02
Attending: STUDENT IN AN ORGANIZED HEALTH CARE EDUCATION/TRAINING PROGRAM
Payer: MEDICAID

## 2024-05-02 DIAGNOSIS — R22.1 MASS OF LEFT SIDE OF NECK: ICD-10-CM

## 2024-05-02 PROCEDURE — 70491 CT SOFT TISSUE NECK W/DYE: CPT | Mod: TC

## 2024-05-02 PROCEDURE — 25500020 PHARM REV CODE 255: Performed by: STUDENT IN AN ORGANIZED HEALTH CARE EDUCATION/TRAINING PROGRAM

## 2024-05-02 RX ADMIN — IOPAMIDOL 100 ML: 755 INJECTION, SOLUTION INTRAVENOUS at 10:05

## 2024-05-03 ENCOUNTER — HOSPITAL ENCOUNTER (OUTPATIENT)
Dept: RADIOLOGY | Facility: HOSPITAL | Age: 39
Discharge: HOME | End: 2024-05-03
Attending: STUDENT IN AN ORGANIZED HEALTH CARE EDUCATION/TRAINING PROGRAM
Payer: MEDICAID

## 2024-05-03 ENCOUNTER — OFFICE VISIT (OUTPATIENT)
Dept: FAMILY MEDICINE | Facility: CLINIC | Age: 39
End: 2024-05-03
Payer: MEDICAID

## 2024-05-03 ENCOUNTER — APPOINTMENT (OUTPATIENT)
Dept: RADIOLOGY | Facility: CLINIC | Age: 39
End: 2024-05-03
Attending: STUDENT IN AN ORGANIZED HEALTH CARE EDUCATION/TRAINING PROGRAM
Payer: MEDICAID

## 2024-05-03 VITALS
WEIGHT: 273.19 LBS | HEIGHT: 67 IN | DIASTOLIC BLOOD PRESSURE: 71 MMHG | OXYGEN SATURATION: 98 % | SYSTOLIC BLOOD PRESSURE: 112 MMHG | TEMPERATURE: 98 F | HEART RATE: 74 BPM | BODY MASS INDEX: 42.88 KG/M2

## 2024-05-03 DIAGNOSIS — R93.0 ABNORMAL HEAD CT: ICD-10-CM

## 2024-05-03 DIAGNOSIS — R59.0 LAD (LYMPHADENOPATHY), SUPRACLAVICULAR: ICD-10-CM

## 2024-05-03 DIAGNOSIS — R22.1 MASS OF LEFT SIDE OF NECK: Primary | ICD-10-CM

## 2024-05-03 DIAGNOSIS — I10 ESSENTIAL HYPERTENSION: ICD-10-CM

## 2024-05-03 LAB
ALBUMIN SERPL BCP-MCNC: 3.4 G/DL (ref 3.5–5)
ALBUMIN/GLOB SERPL: 0.7 {RATIO}
ALP SERPL-CCNC: 85 U/L (ref 37–98)
ALT SERPL W P-5'-P-CCNC: 20 U/L (ref 13–56)
ANION GAP SERPL CALCULATED.3IONS-SCNC: 11 MMOL/L (ref 7–16)
AST SERPL W P-5'-P-CCNC: 28 U/L (ref 15–37)
BASOPHILS # BLD AUTO: 0.02 K/UL (ref 0–0.2)
BASOPHILS NFR BLD AUTO: 0.4 % (ref 0–1)
BILIRUB SERPL-MCNC: 0.6 MG/DL (ref ?–1.2)
BUN SERPL-MCNC: 10 MG/DL (ref 7–18)
BUN/CREAT SERPL: 14 (ref 6–20)
CALCIUM SERPL-MCNC: 9.9 MG/DL (ref 8.5–10.1)
CHLORIDE SERPL-SCNC: 102 MMOL/L (ref 98–107)
CO2 SERPL-SCNC: 28 MMOL/L (ref 21–32)
CREAT SERPL-MCNC: 0.73 MG/DL (ref 0.55–1.02)
DIFFERENTIAL METHOD BLD: ABNORMAL
EGFR (NO RACE VARIABLE) (RUSH/TITUS): 107 ML/MIN/1.73M2
EOSINOPHIL # BLD AUTO: 0.07 K/UL (ref 0–0.5)
EOSINOPHIL NFR BLD AUTO: 1.5 % (ref 1–4)
ERYTHROCYTE [DISTWIDTH] IN BLOOD BY AUTOMATED COUNT: 12.6 % (ref 11.5–14.5)
GLOBULIN SER-MCNC: 4.7 G/DL (ref 2–4)
GLUCOSE SERPL-MCNC: 94 MG/DL (ref 74–106)
HAV IGM SER QL: ABNORMAL
HBV CORE IGM SER QL: ABNORMAL
HBV SURFACE AG SERPL QL IA: REACTIVE
HCT VFR BLD AUTO: 40.2 % (ref 38–47)
HCV AB SER QL: ABNORMAL
HGB BLD-MCNC: 13.2 G/DL (ref 12–16)
HIV 1+O+2 AB SERPL QL: NORMAL
IMM GRANULOCYTES # BLD AUTO: 0.02 K/UL (ref 0–0.04)
IMM GRANULOCYTES NFR BLD: 0.4 % (ref 0–0.4)
LYMPHOCYTES # BLD AUTO: 1.65 K/UL (ref 1–4.8)
LYMPHOCYTES NFR BLD AUTO: 34.8 % (ref 27–41)
MCH RBC QN AUTO: 29.6 PG (ref 27–31)
MCHC RBC AUTO-ENTMCNC: 32.8 G/DL (ref 32–36)
MCV RBC AUTO: 90.1 FL (ref 80–96)
MONOCYTES # BLD AUTO: 0.42 K/UL (ref 0–0.8)
MONOCYTES NFR BLD AUTO: 8.9 % (ref 2–6)
MPC BLD CALC-MCNC: 11.6 FL (ref 9.4–12.4)
NEUTROPHILS # BLD AUTO: 2.56 K/UL (ref 1.8–7.7)
NEUTROPHILS NFR BLD AUTO: 54 % (ref 53–65)
NRBC # BLD AUTO: 0 X10E3/UL
NRBC, AUTO (.00): 0 %
PLATELET # BLD AUTO: 294 K/UL (ref 150–400)
POTASSIUM SERPL-SCNC: 3.6 MMOL/L (ref 3.5–5.1)
PROT SERPL-MCNC: 8.1 G/DL (ref 6.4–8.2)
RBC # BLD AUTO: 4.46 M/UL (ref 4.2–5.4)
SODIUM SERPL-SCNC: 137 MMOL/L (ref 136–145)
SYPHILIS AB INTERPRETATION: NORMAL
WBC # BLD AUTO: 4.74 K/UL (ref 4.5–11)

## 2024-05-03 PROCEDURE — G2211 COMPLEX E/M VISIT ADD ON: HCPCS | Mod: ,,, | Performed by: STUDENT IN AN ORGANIZED HEALTH CARE EDUCATION/TRAINING PROGRAM

## 2024-05-03 PROCEDURE — 87389 HIV-1 AG W/HIV-1&-2 AB AG IA: CPT | Mod: ,,, | Performed by: CLINICAL MEDICAL LABORATORY

## 2024-05-03 PROCEDURE — 3078F DIAST BP <80 MM HG: CPT | Mod: CPTII,,, | Performed by: STUDENT IN AN ORGANIZED HEALTH CARE EDUCATION/TRAINING PROGRAM

## 2024-05-03 PROCEDURE — 86611 BARTONELLA ANTIBODY: CPT | Mod: 90,,, | Performed by: CLINICAL MEDICAL LABORATORY

## 2024-05-03 PROCEDURE — 87340 HEPATITIS B SURFACE AG IA: CPT | Mod: 90,,, | Performed by: CLINICAL MEDICAL LABORATORY

## 2024-05-03 PROCEDURE — 86645 CMV ANTIBODY IGM: CPT | Mod: 90,,, | Performed by: CLINICAL MEDICAL LABORATORY

## 2024-05-03 PROCEDURE — 3008F BODY MASS INDEX DOCD: CPT | Mod: CPTII,,, | Performed by: STUDENT IN AN ORGANIZED HEALTH CARE EDUCATION/TRAINING PROGRAM

## 2024-05-03 PROCEDURE — 80053 COMPREHEN METABOLIC PANEL: CPT | Mod: ,,, | Performed by: CLINICAL MEDICAL LABORATORY

## 2024-05-03 PROCEDURE — 3074F SYST BP LT 130 MM HG: CPT | Mod: CPTII,,, | Performed by: STUDENT IN AN ORGANIZED HEALTH CARE EDUCATION/TRAINING PROGRAM

## 2024-05-03 PROCEDURE — 4010F ACE/ARB THERAPY RXD/TAKEN: CPT | Mod: CPTII,,, | Performed by: STUDENT IN AN ORGANIZED HEALTH CARE EDUCATION/TRAINING PROGRAM

## 2024-05-03 PROCEDURE — 80074 ACUTE HEPATITIS PANEL: CPT | Mod: ,,, | Performed by: CLINICAL MEDICAL LABORATORY

## 2024-05-03 PROCEDURE — 1159F MED LIST DOCD IN RCRD: CPT | Mod: CPTII,,, | Performed by: STUDENT IN AN ORGANIZED HEALTH CARE EDUCATION/TRAINING PROGRAM

## 2024-05-03 PROCEDURE — 1160F RVW MEDS BY RX/DR IN RCRD: CPT | Mod: CPTII,,, | Performed by: STUDENT IN AN ORGANIZED HEALTH CARE EDUCATION/TRAINING PROGRAM

## 2024-05-03 PROCEDURE — 85025 COMPLETE CBC W/AUTO DIFF WBC: CPT | Mod: ,,, | Performed by: CLINICAL MEDICAL LABORATORY

## 2024-05-03 PROCEDURE — 86780 TREPONEMA PALLIDUM: CPT | Mod: ,,, | Performed by: CLINICAL MEDICAL LABORATORY

## 2024-05-03 PROCEDURE — 99215 OFFICE O/P EST HI 40 MIN: CPT | Mod: ,,, | Performed by: STUDENT IN AN ORGANIZED HEALTH CARE EDUCATION/TRAINING PROGRAM

## 2024-05-03 PROCEDURE — 86481 TB AG RESPONSE T-CELL SUSP: CPT | Mod: ,,, | Performed by: CLINICAL MEDICAL LABORATORY

## 2024-05-03 PROCEDURE — 71046 X-RAY EXAM CHEST 2 VIEWS: CPT | Mod: TC,RHCUB,FY | Performed by: STUDENT IN AN ORGANIZED HEALTH CARE EDUCATION/TRAINING PROGRAM

## 2024-05-03 NOTE — PATIENT INSTRUCTIONS
You have an appointment with  Dr. Carl Chavez on Tuesday 5/7/24 at 2:15 PM. In [facility] with the [facility]  [phone]

## 2024-05-06 LAB
GAMMA INTERFERON BACKGROUND BLD IA-ACNC: 0.18 [IU]/ML
M TB IFN-G CD4+ BCKGRND COR BLD-ACNC: 0.05 [IU]/ML
MITOGEN IGNF BCKGRD COR BLD-ACNC: >10 [IU]/ML
QUANTIFERON-TB GOLD PLUS RESULT: NEGATIVE
TB2 AG MINUS NIL RESULT: 0.01

## 2024-05-07 LAB
B HENSELAE IGG TITR SER IF: NORMAL TITER
B HENSELAE IGM TITR SER IF: NORMAL TITER
B QUINTANA IGG TITR SER IF: NORMAL TITER
B QUINTANA IGM TITR SER IF: NORMAL TITER
MAYO GENERIC ORDERABLE RESULT: ABNORMAL

## 2024-05-08 ENCOUNTER — TELEPHONE (OUTPATIENT)
Dept: FAMILY MEDICINE | Facility: CLINIC | Age: 39
End: 2024-05-08
Payer: MEDICAID

## 2024-05-08 DIAGNOSIS — F41.9 ANXIETY: Primary | ICD-10-CM

## 2024-05-08 LAB — CMV IGM SERPL QL IA: NEGATIVE

## 2024-05-08 RX ORDER — LORAZEPAM 2 MG/1
TABLET ORAL
Qty: 2 TABLET | Refills: 0 | Status: SHIPPED | OUTPATIENT
Start: 2024-05-08 | End: 2024-05-20

## 2024-05-08 NOTE — TELEPHONE ENCOUNTER
Please instruct the patient to take one 2 mg ativan tablet 30 minutes prior to MRI. If she is still anxious when that scan starts she can take another half tablet.     She should have someone to drive her after the scan.

## 2024-05-09 ENCOUNTER — TELEPHONE (OUTPATIENT)
Dept: FAMILY MEDICINE | Facility: CLINIC | Age: 39
End: 2024-05-09
Payer: MEDICAID

## 2024-05-09 DIAGNOSIS — R59.0 LAD (LYMPHADENOPATHY), SUPRACLAVICULAR: Primary | ICD-10-CM

## 2024-05-09 LAB — HBV SURFACE AG SERPL QL IA: NORMAL

## 2024-05-09 NOTE — TELEPHONE ENCOUNTER
Please fax over the updated order to [facility] department at [phone].    The patient has an appointment with them for diagnostic mammogram   3:30 PM Next Tuesday 5/14/24

## 2024-05-13 ENCOUNTER — E-CONSULT (OUTPATIENT)
Dept: INFECTIOUS DISEASES | Facility: CLINIC | Age: 39
End: 2024-05-13
Payer: MEDICAID

## 2024-05-13 ENCOUNTER — TELEPHONE (OUTPATIENT)
Dept: FAMILY MEDICINE | Facility: CLINIC | Age: 39
End: 2024-05-13
Payer: MEDICAID

## 2024-05-13 DIAGNOSIS — B18.1 CHRONIC HEPATITIS B: Primary | ICD-10-CM

## 2024-05-13 DIAGNOSIS — R59.1 LYMPHADENOPATHY: ICD-10-CM

## 2024-05-13 PROCEDURE — 99451 NTRPROF PH1/NTRNET/EHR 5/>: CPT | Mod: S$PBB,,, | Performed by: INTERNAL MEDICINE

## 2024-05-13 NOTE — TELEPHONE ENCOUNTER
Please contact the patient and let her know the MRI brain did not show any stroke, masses, or other lesions, which is good news. We will discuss the MRI findings in greater depth at her next visit.

## 2024-05-13 NOTE — PROGRESS NOTES
"Subjective     Patient ID: Mateus Ballesteros is a 39 y.o. female.    Chief Complaint: Follow-up (Discuss CT results/)    HPI    Mateus Ballesteros is a 39 y.o. patient with the medical problems listed below who comes to clinic to discuss abnormal CT results.    She is feeling about the same.     Chart review indicates history of HBV, membranous nephropathy, and h/o PE (2012)       Objective   /71   Pulse 74   Temp 98.1 °F (36.7 °C)   Ht 5' 7" (1.702 m)   Wt 123.9 kg (273 lb 3.2 oz)   LMP 04/23/2024 (Exact Date)   SpO2 98%   BMI 42.79 kg/m²     Physical Exam  Gen: well-groomed, well-dressed. No apparent distress  HEENT:  NCAT, symmetric  Pulm: normal work of breathing, no accessory muscle use; speaking complete sentences without having to stop  Neuro: CN II-XII grossly normal   Psych: worried affect; congruent mood. Linear thought; good eye contact  SKIN: no rashes present on face, neck, hands      Imaging:    EXAMINATION:  CT SOFT TISSUE NECK WITH CONTRAST     CLINICAL HISTORY:  Neck mass, nonpulsatile;Localized swelling, mass and lump, neck     TECHNIQUE:  Low dose axial images as well as sagittal and coronal reconstructions were performed from the skull base to the clavicles following the intravenous administration of 100 Isovue 370.     The CT examination was performed using one or more of the following dose reduction techniques: Automated exposure control, adjustment of the mA and kV according to patient's size, use of acute or iterative reconstruction techniques.     COMPARISON:  None     FINDINGS:  Questionable area of low attenuation seen of the left anterior temporal lobe only partially assessed.     There is no mucosal lesion detected on this study.  The parapharyngeal fat planes are normal.  The parotid and submandibular glands appear normal.  Thyroid normal.     There is a enlarged left supraclavicular lymph node with internal low attenuation measuring 1.9 x 2.1 cm.  Additional smaller lymph nodes " are seen of the right and left jugular chain.  Mild axillary adenopathy partially assessed as well     The vascular structures appear normal.  No fracture.  No osseous lesion.  Lung apices clear.     Impression:     Questionable area of low attenuation of the left anterior temporal lobe partially assessed.  Brain MRI without and with IV contrast recommended.     Mildly enlarged bilateral jugular chain adenopathy.  There is a 2 cm left supraclavicular lymph node with internal necrosis.  The findings are concerning for malignancy with differential also including infectious/inflammatory process.  Biopsy may be needed.             Assessment and Plan     Problem List Items Addressed This Visit       Essential hypertension     Other Visit Diagnoses       Mass of left side of neck    -  Primary    Relevant Orders    Ambulatory referral/consult to General Surgery    MRI Brain W WO Contrast    LAD (lymphadenopathy), supraclavicular        Relevant Orders    HIV 1/2 Ag/Ab (4th Gen) (Completed)    Cytomegalovirus (CMV) Ab, IgM (Completed)    Syphilis Antibody with reflex to RPR (Completed)    Quantiferon Gold TB (Completed)    X-Ray Chest PA And Lateral (Completed)    Hepatitis Panel, Acute (Completed)    Bartonella Anitbody Panel (Completed)    Mammo Digital Screening Bilat    MRI Brain W WO Contrast    HEPATITIS B SURFACE AG W/RFLX TO CONFIRM (Completed)    [facility] GENERIC ORDERABLE (Completed)    Abnormal head CT        Relevant Orders    MRI Brain W WO Contrast            Discussed abnormal CT finidings; referring to surgery for supraclavicular lymph node biopsy. Will obtain additional workup. Needs MRI brain to evaluate abnormally noted on CT (see above). Discussed plan with the patient's mother who accompanied her. RTC 2 weeks for follow-up.       Face to face encounter time 20 minutes.    Non face to face encounter time 20 minutes.  Reviewing separate obtained history, counseling and educating the patient, family and/or  other caregivers, ordering medications, tests or procedures; referring and communicating with other health care professionals; documenting clinical information in the electronic medical record; care coordination; independently interpreting results and communicating results to the patient, family, and/or caregivers.    Total time for visit  40 minutes

## 2024-05-13 NOTE — CONSULTS
[facility] - Infectious Disease  Response for E-Consult     Patient Name: Mateus Ballesteros  MRN: [id number]  Primary Care Provider: Rigoberto Hannah MD   Requesting Provider: Rigoberto Hannah MD  Consults    Question: She has new LAD (lymphadenopathy) in the setting of chronic Hep B; any other tests I should order besides hep B PCR?     Recommendation:   - Complex case.  Unclear if necrotic lymphadenopathy of the neck is associated with possible chronic hepatitis-B.  - With regards to hepatitis-B: Consider HepB c ab, HepB DNA by PCR, HepBeAg, US of the liver + AFP if felt to be chronic.   - HepA abs. If negative, then consider vaccination  - May refer to Hepatology unless you are comfortable diagnosis/treating chronic Hep B    - With regards to necrotic lymphadenopathy, consider fungal workup with Blastomyces and Histoplasma antigen in urine.  Fungitell.    - May also consider CT scan of chest and abdomen depending on patient's condition.  - I agree with biopsy of lymph nodes.    Contingency:   - Consider further referrals.   - if patient is feeling ill then consider evaluation in a hospital setting for expedited workup.    Total time of Consultation: 10 minute    I did not speak to the requesting provider verbally about this.     *This eConsult is based on the clinical data available to me and is furnished without benefit of a physical examination. The eConsult will need to be interpreted in light of any clinical issues or changes in patient status not available to me at the time of filing this eConsults. Significant changes in patient condition or level of acuity should result in immediate formal consultation and reevaluation. Please alert me if you have further questions.    Thank you for this eConsult referral.     MD [facility] - Infectious Disease

## 2024-05-17 ENCOUNTER — OFFICE VISIT (OUTPATIENT)
Dept: FAMILY MEDICINE | Facility: CLINIC | Age: 39
End: 2024-05-17
Payer: MEDICAID

## 2024-05-17 VITALS
OXYGEN SATURATION: 98 % | WEIGHT: 277 LBS | SYSTOLIC BLOOD PRESSURE: 119 MMHG | BODY MASS INDEX: 43.47 KG/M2 | HEIGHT: 67 IN | RESPIRATION RATE: 20 BRPM | DIASTOLIC BLOOD PRESSURE: 75 MMHG | TEMPERATURE: 98 F

## 2024-05-17 DIAGNOSIS — R59.0 LAD (LYMPHADENOPATHY), SUPRACLAVICULAR: ICD-10-CM

## 2024-05-17 DIAGNOSIS — B37.9 YEAST INFECTION: ICD-10-CM

## 2024-05-17 DIAGNOSIS — R90.82 WHITE MATTER ABNORMALITY ON MRI OF BRAIN: Primary | ICD-10-CM

## 2024-05-17 DIAGNOSIS — R80.9 PROTEINURIA, UNSPECIFIED TYPE: ICD-10-CM

## 2024-05-17 LAB
AFP-TM SERPL-MCNC: 2.2 NG/ML (ref 0–8)
BILIRUB SERPL-MCNC: NORMAL MG/DL
BILIRUB UR QL STRIP: NEGATIVE
BLOOD URINE, POC: NORMAL
C3 SERPL-MCNC: 144 MG/DL (ref 90–180)
C4 SERPL-MCNC: 35 MG/DL (ref 10–40)
CLARITY UR: CLEAR
COLOR UR: ABNORMAL
COLOR, POC UA: YELLOW
CREAT UR-MCNC: 119 MG/DL (ref 28–219)
CRP SERPL-MCNC: 1.18 MG/DL (ref 0–0.8)
ERYTHROCYTE [SEDIMENTATION RATE] IN BLOOD BY WESTERGREN METHOD: 55 MM/HR (ref 0–20)
GLUCOSE UR QL STRIP: NORMAL
GLUCOSE UR STRIP-MCNC: NORMAL MG/DL
HAV AB SER QL IA: NORMAL
HBV CORE IGM SER QL: NORMAL
KETONES UR QL STRIP: NORMAL
KETONES UR STRIP-SCNC: NEGATIVE MG/DL
LEUKOCYTE ESTERASE UR QL STRIP: NEGATIVE
LEUKOCYTE ESTERASE URINE, POC: NORMAL
MUCOUS, UA: ABNORMAL /LPF
NITRITE UR QL STRIP: NEGATIVE
NITRITE, POC UA: NORMAL
PH UR STRIP: 6.5 PH UNITS
PH, POC UA: 6.5
PROT UR QL STRIP: 100
PROT UR-MCNC: 119.6 MG/DL (ref 0–11.9)
PROT/CREAT 24H UR-RTO: 1
PROTEIN, POC: >=300
RBC # UR STRIP: NEGATIVE /UL
RBC #/AREA URNS HPF: 1 /HPF
SP GR UR STRIP: 1.02
SPECIFIC GRAVITY, POC UA: >=1.03
SQUAMOUS #/AREA URNS LPF: ABNORMAL /HPF
UROBILINOGEN UR STRIP-ACNC: NORMAL MG/DL
UROBILINOGEN, POC UA: 0.2
WBC #/AREA URNS HPF: <1 /HPF

## 2024-05-17 PROCEDURE — G2211 COMPLEX E/M VISIT ADD ON: HCPCS | Mod: ,,, | Performed by: STUDENT IN AN ORGANIZED HEALTH CARE EDUCATION/TRAINING PROGRAM

## 2024-05-17 PROCEDURE — 86705 HEP B CORE ANTIBODY IGM: CPT | Mod: ,,, | Performed by: CLINICAL MEDICAL LABORATORY

## 2024-05-17 PROCEDURE — 86708 HEPATITIS A ANTIBODY: CPT | Mod: ,,, | Performed by: CLINICAL MEDICAL LABORATORY

## 2024-05-17 PROCEDURE — 87449 NOS EACH ORGANISM AG IA: CPT | Mod: 90,,, | Performed by: CLINICAL MEDICAL LABORATORY

## 2024-05-17 PROCEDURE — 86160 COMPLEMENT ANTIGEN: CPT | Mod: ,,, | Performed by: CLINICAL MEDICAL LABORATORY

## 2024-05-17 PROCEDURE — 86707 HEPATITIS BE ANTIBODY: CPT | Mod: 90,,, | Performed by: CLINICAL MEDICAL LABORATORY

## 2024-05-17 PROCEDURE — 81001 URINALYSIS AUTO W/SCOPE: CPT | Mod: ,,, | Performed by: CLINICAL MEDICAL LABORATORY

## 2024-05-17 PROCEDURE — 81003 URINALYSIS AUTO W/O SCOPE: CPT | Mod: RHCUB | Performed by: STUDENT IN AN ORGANIZED HEALTH CARE EDUCATION/TRAINING PROGRAM

## 2024-05-17 PROCEDURE — 1159F MED LIST DOCD IN RCRD: CPT | Mod: CPTII,,, | Performed by: STUDENT IN AN ORGANIZED HEALTH CARE EDUCATION/TRAINING PROGRAM

## 2024-05-17 PROCEDURE — 86160 COMPLEMENT ANTIGEN: CPT | Mod: XU,,, | Performed by: CLINICAL MEDICAL LABORATORY

## 2024-05-17 PROCEDURE — 4010F ACE/ARB THERAPY RXD/TAKEN: CPT | Mod: CPTII,,, | Performed by: STUDENT IN AN ORGANIZED HEALTH CARE EDUCATION/TRAINING PROGRAM

## 2024-05-17 PROCEDURE — 3074F SYST BP LT 130 MM HG: CPT | Mod: CPTII,,, | Performed by: STUDENT IN AN ORGANIZED HEALTH CARE EDUCATION/TRAINING PROGRAM

## 2024-05-17 PROCEDURE — 82570 ASSAY OF URINE CREATININE: CPT | Mod: ,,, | Performed by: CLINICAL MEDICAL LABORATORY

## 2024-05-17 PROCEDURE — 85651 RBC SED RATE NONAUTOMATED: CPT | Mod: ,,, | Performed by: CLINICAL MEDICAL LABORATORY

## 2024-05-17 PROCEDURE — 99214 OFFICE O/P EST MOD 30 MIN: CPT | Mod: ,,, | Performed by: STUDENT IN AN ORGANIZED HEALTH CARE EDUCATION/TRAINING PROGRAM

## 2024-05-17 PROCEDURE — 86140 C-REACTIVE PROTEIN: CPT | Mod: ,,, | Performed by: CLINICAL MEDICAL LABORATORY

## 2024-05-17 PROCEDURE — 3078F DIAST BP <80 MM HG: CPT | Mod: CPTII,,, | Performed by: STUDENT IN AN ORGANIZED HEALTH CARE EDUCATION/TRAINING PROGRAM

## 2024-05-17 PROCEDURE — 87517 HEPATITIS B DNA QUANT: CPT | Mod: 90,,, | Performed by: CLINICAL MEDICAL LABORATORY

## 2024-05-17 PROCEDURE — 82105 ALPHA-FETOPROTEIN SERUM: CPT | Mod: ,,, | Performed by: CLINICAL MEDICAL LABORATORY

## 2024-05-17 PROCEDURE — 86235 NUCLEAR ANTIGEN ANTIBODY: CPT | Mod: ,,, | Performed by: CLINICAL MEDICAL LABORATORY

## 2024-05-17 PROCEDURE — 86038 ANTINUCLEAR ANTIBODIES: CPT | Mod: ,,, | Performed by: CLINICAL MEDICAL LABORATORY

## 2024-05-17 PROCEDURE — 84156 ASSAY OF PROTEIN URINE: CPT | Mod: ,,, | Performed by: CLINICAL MEDICAL LABORATORY

## 2024-05-17 PROCEDURE — 3008F BODY MASS INDEX DOCD: CPT | Mod: CPTII,,, | Performed by: STUDENT IN AN ORGANIZED HEALTH CARE EDUCATION/TRAINING PROGRAM

## 2024-05-17 PROCEDURE — 86225 DNA ANTIBODY NATIVE: CPT | Mod: ,,, | Performed by: CLINICAL MEDICAL LABORATORY

## 2024-05-17 PROCEDURE — 1160F RVW MEDS BY RX/DR IN RCRD: CPT | Mod: CPTII,,, | Performed by: STUDENT IN AN ORGANIZED HEALTH CARE EDUCATION/TRAINING PROGRAM

## 2024-05-17 PROCEDURE — 36415 COLL VENOUS BLD VENIPUNCTURE: CPT | Performed by: STUDENT IN AN ORGANIZED HEALTH CARE EDUCATION/TRAINING PROGRAM

## 2024-05-17 RX ORDER — DOXYCYCLINE HYCLATE 100 MG
100 TABLET ORAL 2 TIMES DAILY
COMMUNITY
Start: 2024-05-07

## 2024-05-17 RX ORDER — FLUCONAZOLE 150 MG/1
TABLET ORAL
Qty: 2 TABLET | Refills: 0 | Status: SHIPPED | OUTPATIENT
Start: 2024-05-17

## 2024-05-17 RX ORDER — FLUCONAZOLE 150 MG/1
TABLET ORAL
Qty: 2 TABLET | Refills: 0 | Status: SHIPPED | OUTPATIENT
Start: 2024-05-17 | End: 2024-05-17

## 2024-05-17 NOTE — PROGRESS NOTES
Health Maintenance Due   Topic Date Due    Lipid Panel  Never done    Pneumococcal Vaccines (Age 0-64) (1 of 2 - PCV) Never done    Hemoglobin A1c (Diabetic Prevention Screening)  Never done    COVID-19 Vaccine (1 - 2023-24 season) Never done    TETANUS VACCINE  03/31/2024       Here for follow up on MRI results pt encouraged to go to pharmacy for Vaccines

## 2024-05-17 NOTE — PROGRESS NOTES
"Subjective     Patient ID: Mateus Ballesteros is a 39 y.o. female.    Chief Complaint: follow up for labs and to discuss MRI results  (Reports no pain since starting the doxycline )    HPI    Lymphadenopathy has improved since starting doxycycline.    Will continue workup for necrotic LAD     Yeast infection - will do fluconazole.    Discussed recent lab results and MRI report again with patient; referred to neurology    ID e-consult:     Recommendation:   - Complex case.  Unclear if necrotic lymphadenopathy of the neck is associated with possible chronic hepatitis-B.  - With regards to hepatitis-B: Consider HepB c ab, HepB DNA by PCR, HepBeAg, US of the liver + AFP if felt to be chronic.   - HepA abs. If negative, then consider vaccination  - May refer to Hepatology unless you are comfortable diagnosis/treating chronic Hep B    - With regards to necrotic lymphadenopathy, consider fungal workup with Blastomyces and Histoplasma antigen in urine.  Fungitell.    - May also consider CT scan of chest and abdomen depending on patient's condition.  - I agree with biopsy of lymph nodes.     Contingency:   - Consider further referrals.   - if patient is feeling ill then consider evaluation in a hospital setting for expedited workup.       Objective   /75 (BP Location: Left arm, Patient Position: Sitting)   Temp 98.2 °F (36.8 °C)   Resp 20   Ht 5' 7" (1.702 m)   Wt 125.6 kg (277 lb)   LMP 04/23/2024 (Exact Date)   SpO2 98%   BMI 43.38 kg/m²     Physical Exam  Gen: well-groomed, well-dressed. No apparent distress  HEENT:  NCAT, symmetric  Pulm: normal work of breathing, no accessory muscle use; speaking complete sentences without having to stop  Neuro: CN II-XII grossly normal   Psych: pleasant affect, congruent mood. Linear thought; good eye contact  SKIN: no rashes present on face, neck, hands       Assessment and Plan     Problem List Items Addressed This Visit    None  Visit Diagnoses       White matter " abnormality on MRI of brain    -  Primary    Relevant Orders    Ambulatory referral/consult to Neurology    [place] GENERIC ORDERABLE SFUNG - Overview: 1,3-Beta-D-Glucan (Fungitell), Serum    LAD (lymphadenopathy), supraclavicular        Relevant Orders    Hepatitis B Core Antibody, IgM (Completed)    Hepatitis B DNA, Quant    Hepatitis B e antibody    Hepatitis A Antibody, Total (Completed)    AFP Tumor Marker (Completed)    US Abdomen Limited_Liver    Misc Sendout Test, Blood fungitell    Misc Sendout Test, Non-Blood blastomyces and histoplasma urine antigen    TAMI EIA w/ Reflex to dsDNA/GLENYS    Sedimentation Rate (Completed)    C-Reactive Protein (Completed)    C3 Complement (Completed)    C4 Complement (Completed)    Urinalysis (Completed)    POCT URINALYSIS W/O SCOPE (Completed)    CBC Auto Differential    Urinalysis, Microscopic (Completed)    [facility] ORDERABLE SFUNG - Overview: 1,3-Beta-D-Glucan (Fungitell), Serum    Yeast infection        Relevant Medications    fluconazole (DIFLUCAN) 150 MG Tab    Other Relevant Orders    [facility] GENERIC ORDERABLE SFUNG - Overview: 1,3-Beta-D-Glucan (Fungitell), Serum    Proteinuria, unspecified type        Relevant Orders    Protein/Creatinine Ratio, Urine (Completed)    [facility] ORDERABLE SFUNG - Overview: 1,3-Beta-D-Glucan (Fungitell), Serum          Discussed referral to neurology to evaluate white matter changes seen on MRI brain    Obtaining additional workup to assess for fungal infection, AI, clarify the meaning of hepatitis B s Ag. Her inflammatory markers are elevated     Doing some better after starting doxycyline, though I can still feel the L supraclavicular LAD.    POCT UA showing proteinuria; will add on spot UPC     AFP wnl; complement levels wnl; awaiting US liver.

## 2024-05-21 ENCOUNTER — PATIENT OUTREACH (OUTPATIENT)
Dept: ADMINISTRATIVE | Facility: HOSPITAL | Age: 39
End: 2024-05-21

## 2024-05-21 LAB
ANA SER QL: POSITIVE
DSDNA IGG SERPL IA-ACNC: 5 IU (ref 0–24)
DSDNA IGG SERPL IA-ACNC: NEGATIVE [IU]/ML
ENA AB SER QL IA: 169.7 EUS (ref 0–19.9)
ENA AB SER QL IA: POSITIVE
HBV DNA SERPL NAA+PROBE-ACNC: 1490 IU/ML
HBV E IGG SER QL IA: POSITIVE
MAYO GENERIC ORDERABLE RESULT: NORMAL

## 2024-05-22 ENCOUNTER — HOSPITAL ENCOUNTER (OUTPATIENT)
Dept: RADIOLOGY | Facility: HOSPITAL | Age: 39
Discharge: HOME | End: 2024-05-22
Attending: STUDENT IN AN ORGANIZED HEALTH CARE EDUCATION/TRAINING PROGRAM
Payer: MEDICAID

## 2024-05-22 ENCOUNTER — TELEPHONE (OUTPATIENT)
Dept: FAMILY MEDICINE | Facility: CLINIC | Age: 39
End: 2024-05-22
Payer: MEDICAID

## 2024-05-22 ENCOUNTER — E-CONSULT (OUTPATIENT)
Dept: INFECTIOUS DISEASES | Facility: HOSPITAL | Age: 39
End: 2024-05-22

## 2024-05-22 DIAGNOSIS — R59.0 LAD (LYMPHADENOPATHY), SUPRACLAVICULAR: ICD-10-CM

## 2024-05-22 DIAGNOSIS — B19.10 HEPATITIS B INFECTION WITHOUT DELTA AGENT WITHOUT HEPATIC COMA, UNSPECIFIED CHRONICITY: Primary | ICD-10-CM

## 2024-05-22 DIAGNOSIS — B16.9 ACUTE HEPATITIS B VIRUS INFECTION: Primary | ICD-10-CM

## 2024-05-22 LAB
ENA JO1 IGG SER-ACNC: <0.2 U
ENA RNP IGG SER IA-ACNC: 2.3 U
ENA SCL70 IGG SER IA-ACNC: 0.2 U
ENA SM IGG SER-ACNC: 0.2 U
ENA SS-A IGG SER-ACNC: <0.2 U
ENA SS-B IGG SER-ACNC: <0.2 U

## 2024-05-22 PROCEDURE — 76705 ECHO EXAM OF ABDOMEN: CPT | Mod: TC

## 2024-05-22 PROCEDURE — 99499 UNLISTED E&M SERVICE: CPT | Mod: ,,, | Performed by: INTERNAL MEDICINE

## 2024-05-22 NOTE — CONSULTS
[facility] INFECTIOUS DISEASE  Response for E-Consult     Patient Name: Mateus Ballesteros  MRN: [id number]  Primary Care Provider: Rigoberto Hannah MD   Requesting Provider: Rigoberto Hannah MD  Consults    Unfortunately, this eConsult has been declined due to: Other    Other:  This eConsult submission cannot be completed at this time due to refer to hepatology for in person evaluation.      Thank you for this eConsult referral.     Cary Gaytan DO  [facility] INFECTIOUS DISEASE

## 2024-05-22 NOTE — TELEPHONE ENCOUNTER
Positive hepatitis B DNA - indicating active infection. Will notify patient and facilitate treatment for this. Will also discuss with ID

## 2024-05-31 ENCOUNTER — OFFICE VISIT (OUTPATIENT)
Dept: FAMILY MEDICINE | Facility: CLINIC | Age: 39
End: 2024-05-31
Payer: MEDICAID

## 2024-05-31 VITALS
DIASTOLIC BLOOD PRESSURE: 67 MMHG | WEIGHT: 274.81 LBS | HEIGHT: 67 IN | SYSTOLIC BLOOD PRESSURE: 107 MMHG | OXYGEN SATURATION: 96 % | BODY MASS INDEX: 43.13 KG/M2 | TEMPERATURE: 98 F | HEART RATE: 64 BPM

## 2024-05-31 DIAGNOSIS — B19.10 HEPATITIS B INFECTION WITHOUT DELTA AGENT WITHOUT HEPATIC COMA, UNSPECIFIED CHRONICITY: ICD-10-CM

## 2024-05-31 DIAGNOSIS — R59.0 LAD (LYMPHADENOPATHY), SUPRACLAVICULAR: Primary | ICD-10-CM

## 2024-05-31 PROCEDURE — 3074F SYST BP LT 130 MM HG: CPT | Mod: CPTII,,, | Performed by: STUDENT IN AN ORGANIZED HEALTH CARE EDUCATION/TRAINING PROGRAM

## 2024-05-31 PROCEDURE — 1159F MED LIST DOCD IN RCRD: CPT | Mod: CPTII,,, | Performed by: STUDENT IN AN ORGANIZED HEALTH CARE EDUCATION/TRAINING PROGRAM

## 2024-05-31 PROCEDURE — 1160F RVW MEDS BY RX/DR IN RCRD: CPT | Mod: CPTII,,, | Performed by: STUDENT IN AN ORGANIZED HEALTH CARE EDUCATION/TRAINING PROGRAM

## 2024-05-31 PROCEDURE — 3008F BODY MASS INDEX DOCD: CPT | Mod: CPTII,,, | Performed by: STUDENT IN AN ORGANIZED HEALTH CARE EDUCATION/TRAINING PROGRAM

## 2024-05-31 PROCEDURE — 3078F DIAST BP <80 MM HG: CPT | Mod: CPTII,,, | Performed by: STUDENT IN AN ORGANIZED HEALTH CARE EDUCATION/TRAINING PROGRAM

## 2024-05-31 PROCEDURE — 4010F ACE/ARB THERAPY RXD/TAKEN: CPT | Mod: CPTII,,, | Performed by: STUDENT IN AN ORGANIZED HEALTH CARE EDUCATION/TRAINING PROGRAM

## 2024-05-31 PROCEDURE — 99215 OFFICE O/P EST HI 40 MIN: CPT | Mod: ,,, | Performed by: STUDENT IN AN ORGANIZED HEALTH CARE EDUCATION/TRAINING PROGRAM

## 2024-05-31 NOTE — PROGRESS NOTES
"Subjective     Patient ID: Mateus Ballesteros is a 39 y.o. female.    Chief Complaint: Follow-up (2 weeks; discuss lab results from May 3-21; also disc results of US and MRI Brain/)    HPI    39-year-old woman with necrotic LAD of the neck that seems to have improved after treatment of doxycycline. Her surgeon, Dr. Chavez declined left supraclavicular lymph node biopsy at this time and encouraged her to RTC if the swelling recurs. She is feeling somewhat better but generally fatigued and pain all over her body at times. She just quit a very stressful job. She has previously screened positive for MDD.    Her left supraclavicular LAD has decreased and she appears well on exam. HepB DNA was 1490. HepB core Ab was non-reactive. HepBe Ab was positive. Hepatitis A Ab was negative. US of the liver showed fatty infiltration. AFP was negative. She did not have any elevation of ALT or AST.     Fungal workup for necrotic LAD with fungitell was negative. Blastomyces and Histoplasma urine antigen tests have not resulted yet.    ESR was elevated at 55, CRP elevated to 1.18. TAMI was positive; complement levels were normal, but reflex testing showed a positve RNP Ab at 2.3; (SS-A, SS-B, Sm Ab, Scl 70 Ab, and Anneliese Ab all negative). UPC does show some proteinuria.     Regarding the abnormal MRI, she has a consultation with Dr. Barnes next week. She has not had any overt neurologic symptoms.      Objective   /67   Pulse 64   Temp 98.1 °F (36.7 °C)   Ht 5' 7" (1.702 m)   Wt 124.6 kg (274 lb 12.8 oz)   LMP 04/23/2024 (Exact Date)   SpO2 96%   BMI 43.04 kg/m²     Wt Readings from Last 3 Encounters:   05/31/24 0856 124.6 kg (274 lb 12.8 oz)   05/17/24 0849 125.6 kg (277 lb)   05/03/24 0822 123.9 kg (273 lb 3.2 oz)       Physical Exam    Gen: well-groomed, well-dressed. No apparent distress  HEENT:  NCAT, symmetric  Pulm: normal work of breathing, no accessory muscle use; speaking complete sentences without having to stop  Neuro: " CN II-XII grossly normal   Psych: pleasant affect, congruent mood. Linear thought; good eye contact  SKIN: no rashes present on face, neck, hands    Imaging:    US ABDOMEN LIMITED_LIVER     CLINICAL HISTORY:  Abdominal Pain     TECHNIQUE:  Grayscale and color Doppler imaging performed     COMPARISON:  None available     FINDINGS:  The liver is normal in size with increased parenchymal echogenicity.  The gallbladder is fluid-filled without evidence of stones or sludge. The gallbladder wall thickness is 3.2 mm. The common bile duct measures 2.2 mm.     The visualized portion of the pancreas appear within normal limits     The right kidney appears within normal limits, not measured.     Visualized aorta and IVC appear within normal limits. No free fluid or free air seen.     Impression:     Fatty liver infiltration.  No other evidence of abnormality demonstrated.     Ultrasound images stored and captured.        Electronically signed by:Ha Cooper  Date:                                            05/22/2024  Time:                                           09:16    ..    MRI Brain W WO Contrast  Order: [id number]  Impression    IMPRESSION:  1. No acute intracranial abnormality.  2. Small foci of increased T2-weighted signal in the white matter of  the bilateral frontal lobes. This is abnormal for the patient's age.  This can be seen due to premature changes of chronic small vessel  ischemic disease in patient with vascular risk factors. Alternatively,  a demyelinating process should be considered.       Assessment and Plan     Problem List Items Addressed This Visit       Hepatitis B infection without delta agent without hepatic coma    Relevant Orders    Ambulatory referral/consult to Hepatology     Other Visit Diagnoses       LAD (lymphadenopathy), supraclavicular    -  Primary            Her left supraclavicular LAD has decreased and she appears well on exam. HepB DNA was 1490. HepB core Ab was non-reactive.  HepBe Ab was positive. Hepatitis A Ab was negative. US of the liver showed fatty infiltration. AFP was negative.    Fungal workup for necrotic LAD with fungitell was negative. Blastomyces and Histoplasma urine antigen tests have not resulted yet.    ESR was elevated at 55, CRP elevated to 1.18. TAMI was positive; complement levels were normal, but reflex testing showed a positve RNP Ab at 2.3; (SS-A, SS-B, Sm Ab, Scl 70 Ab, and Anneliese Ab all negative). UPC does show some proteinuria but GFR has been stable    Regarding the abnormal MRI, she has a consultation with Dr. Barnes next week. She has not had any overt neurologic symptoms.     Discussed the above results with the patient. Plan to continue workup and referral to specialists. Discussed the importance of following up with hepatology and neurology. Will hold off on referral to rheum at this point and monitor clinically (do not think she has MCTD or SLE). RTC 1 month.

## 2024-05-31 NOTE — PATIENT INSTRUCTIONS
Your hepatitis B test was positive. We [place] [facility] [place]   Your neurologist appointment on June 4th is with:    Fer Barnes MD  Neurology  [address], [place], [address]      Phone: [phone]

## 2024-06-25 DIAGNOSIS — M54.12 CERVICAL RADICULOPATHY: Primary | ICD-10-CM

## 2024-06-28 ENCOUNTER — OFFICE VISIT (OUTPATIENT)
Dept: FAMILY MEDICINE | Facility: CLINIC | Age: 39
End: 2024-06-28
Payer: MEDICAID

## 2024-06-28 VITALS
HEIGHT: 67 IN | BODY MASS INDEX: 43.16 KG/M2 | TEMPERATURE: 98 F | DIASTOLIC BLOOD PRESSURE: 66 MMHG | SYSTOLIC BLOOD PRESSURE: 101 MMHG | HEART RATE: 67 BPM | WEIGHT: 275 LBS | OXYGEN SATURATION: 97 %

## 2024-06-28 DIAGNOSIS — I10 ESSENTIAL HYPERTENSION: ICD-10-CM

## 2024-06-28 PROCEDURE — 1160F RVW MEDS BY RX/DR IN RCRD: CPT | Mod: CPTII,,, | Performed by: STUDENT IN AN ORGANIZED HEALTH CARE EDUCATION/TRAINING PROGRAM

## 2024-06-28 PROCEDURE — 1159F MED LIST DOCD IN RCRD: CPT | Mod: CPTII,,, | Performed by: STUDENT IN AN ORGANIZED HEALTH CARE EDUCATION/TRAINING PROGRAM

## 2024-06-28 PROCEDURE — 99213 OFFICE O/P EST LOW 20 MIN: CPT | Mod: ,,, | Performed by: STUDENT IN AN ORGANIZED HEALTH CARE EDUCATION/TRAINING PROGRAM

## 2024-06-28 PROCEDURE — 3074F SYST BP LT 130 MM HG: CPT | Mod: CPTII,,, | Performed by: STUDENT IN AN ORGANIZED HEALTH CARE EDUCATION/TRAINING PROGRAM

## 2024-06-28 PROCEDURE — 3008F BODY MASS INDEX DOCD: CPT | Mod: CPTII,,, | Performed by: STUDENT IN AN ORGANIZED HEALTH CARE EDUCATION/TRAINING PROGRAM

## 2024-06-28 PROCEDURE — 4010F ACE/ARB THERAPY RXD/TAKEN: CPT | Mod: CPTII,,, | Performed by: STUDENT IN AN ORGANIZED HEALTH CARE EDUCATION/TRAINING PROGRAM

## 2024-06-28 PROCEDURE — 3078F DIAST BP <80 MM HG: CPT | Mod: CPTII,,, | Performed by: STUDENT IN AN ORGANIZED HEALTH CARE EDUCATION/TRAINING PROGRAM

## 2024-06-28 RX ORDER — LOSARTAN POTASSIUM 50 MG/1
50 TABLET ORAL DAILY
Qty: 30 TABLET | Refills: 2 | Status: SHIPPED | OUTPATIENT
Start: 2024-06-28 | End: 2024-09-26

## 2024-06-28 RX ORDER — HYDROCHLOROTHIAZIDE 25 MG/1
25 TABLET ORAL DAILY
Qty: 30 TABLET | Refills: 2 | Status: SHIPPED | OUTPATIENT
Start: 2024-06-28 | End: 2024-09-26

## 2024-07-01 NOTE — PROGRESS NOTES
"Subjective     Patient ID: Mateus Ballesteros is a 39 y.o. female.    Chief Complaint: Follow-up (4 weeks; discuss GI visit and MRI Brain repo)    HPI    Mateus Ballesteros is a 39 y.o. patient with the medical problems listed below who comes to clinic for follow-up. Is feeling better. Blood pressure much better controlled; she changes jobs and feels as though her stress has improved. No more lymph node swelling. Has seen GI about chronic hep B and has plans for fibroscan to evaluate hepatic steatosis.    Has also seen neurology who plans to review the images on her recent MRI; no new or changing neurologic symptoms.      Objective   /66   Pulse 67   Temp 98.1 °F (36.7 °C)   Ht 5' 7" (1.702 m)   Wt 124.7 kg (275 lb)   SpO2 97%   BMI 43.07 kg/m²     Physical Exam  Gen: well-groomed, well-dressed. No apparent distress  HEENT:  NCAT, symmetric  Pulm: normal work of breathing, no accessory muscle use; speaking complete sentences without having to stop  Neuro: CN II-XII grossly normal   Psych: pleasant affect, congruent mood. Linear thought; good eye contact  SKIN: no rashes present on face, neck, hands       Assessment and Plan     Problem List Items Addressed This Visit       Essential hypertension    Relevant Medications    hydroCHLOROthiazide (HYDRODIURIL) 25 MG tablet    losartan (COZAAR) 50 MG tablet     The current medical regimen is effective;  continue present plan and medications.         "

## 2024-07-03 ENCOUNTER — CLINICAL SUPPORT (OUTPATIENT)
Dept: REHABILITATION | Facility: HOSPITAL | Age: 39
End: 2024-07-03
Payer: MEDICAID

## 2024-07-03 DIAGNOSIS — M54.12 CERVICAL RADICULOPATHY: ICD-10-CM

## 2024-07-03 PROCEDURE — 97163 PT EVAL HIGH COMPLEX 45 MIN: CPT

## 2024-07-03 NOTE — PLAN OF CARE
"[facility]  Physical Therapy Initial Evaluation    Name: Mateus Ballesteros  Clinic Number: [id number]    Therapy Diagnosis: No diagnosis found.  Physician: Fer Barnes MD    Physician Orders: PT Eval and Treat   Medical Diagnosis from Referral: Cervical Radiculopathy (M54.12)  Evaluation Date: 7/3/2024  Authorization Period Expiration: *8/23/2024  Visit # / Visits authorized: 1/ 13    Time In: 0816  Time Out: 0911  Total Appointment Time (timed & untimed codes): 55 minutes    Precautions: Standard and CHF    Subjective     Date of onset: April 2024  History of current condition - Nicolea reports: Pt reports several years of intermittent LUE pain, numbness and tingling. This worsens with washing dishes, cooking and prolonged standing. States it extends down the inside of her arm all the way to her finger tips. She also c/o pain in posterior lower cervical/upper thoracic regions. She was recently treated for a painful left subclavicular mass with internal necrosis which was treated with ABT.     Medical History:   Past Medical History:   Diagnosis Date    CHF (congestive heart failure)     Hypertension    Pt is newly diagnosed with lymphadenopathy.Chronic Hep B.    Surgical History:   Mateus Ballesteros had a recent biopsy of lymph node.    Medications:   Mateus has a current medication list which includes the following prescription(s): diclofenac sodium, doxycycline, fluconazole, hydrochlorothiazide, and losartan.    Allergies:   Review of patient's allergies indicates:   Allergen Reactions    Penicillins Nausea And Vomiting     Per pt made her "foam at the mouth"      Sulfa (sulfonamide antibiotics) Rash        Imaging:   MRI 5/13/2024 of brain: negative for infarction or intracranial hemorrhage.  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  Prior Level of Function: Pt has had multiple years of LUE pain, numbness and tingling.  Current Level of Function: Pt limited in ability to stand, cook, wash dishes. Difficulty with lying on her side.    Pain:  Current 7/10, worst 10/10, best 4/10   Location: central lower neck /upper thoracic extending to left side of neck to shldr down inside arm to finger tips  Description: Tingling and Numb  Aggravating Factors: Standing, Night Time, Extension, and Flexing  Easing Factors: heating pad and hand held vibrator    Pts goals: Get neck better so she can get a job.    Objective     Posture:   Forward head: yes  Thoracic curve: WFL  Cervical curve: decreased  Laterally flexed: left and slightly  Rotated: left  Rounded shoulders: yes  Scoliosis: NT  Shoulder height: increased  Clavical height: left  Comments:    Cervical range of motion:  Cervical AROM Pain/Dysfunction with Movement   Flexion 100% NO   Extension 100% NO   Right lateral flexion 100% NO   Left lateral flexion 100% NO   Right rotation 100% NO   Left rotation 100% NO     Cervical manual muscle test:    Right  Left    C1-C2 Chin up MMT strength: 4/5 MMT strength: 4/5   C1-C2 Chin in MMT shoulder: 4/5 MMT strength: 4/5   C3 Lateral flexion MMT strength: 4/5 MMT strength: 4-/5   C4 Shoulder Shrug MMT strength: 4/5 MMT strength: 4/5   C5 Biceps MMT strength: 4+/5 MMT strength: 4/5   C6 Wrist extension MMT strength: 4+/5 MMT strength: 4/5   C7 Triceps MMT strength: 4+/5 MMT strength: 4-/5     Shoulder range of motion:    Right  Left    Flexion (180) WFL WFL   Internal rotation (45) WFL WFL   External rotation (45) WFL WFL   Abduction (180) WFL WFL   Other       Segment/Mobility:     Clinical Special Tests:   Compression test: Negative  Thoracic outlet: Positive left side, (Adson) Negative right side  Distraction: Negative  Vertebral artery: right ; left   Liza test: positive BUE      Palpation:   Body side  Increased tone   Sternocleidomastoid bilateral Yes   Scalenes bilateral Yes   1st rib left Yes   Upper traps bilateral Yes   Suboccipitals bilateral Yes   Transverse process bilateral Yes   Spinous process     Pec Minor bilateral Yes   Masseter     Mastoid process     Levator Scapula bilateral Yes         Limitation/Restriction for FOTO Intake Survey    Therapist reviewed FOTO scores for Mateus Ballesteros on 7/3/2024.   FOTO documents entered into [employer] - see Media section.    Limitation Score: 52%       TREATMENT     Home Exercises and Patient Education Provided    Education provided:   Results of eval, POC, HEP    Written Home Exercises Provided: yes. Scalene stretch.  Exercises were reviewed and Mateus was able to demonstrate them prior to the end of the session.  Mateus demonstrated fair  understanding of the education provided.     See EMR under Media for exercises provided 7/3/2024.    Assessment     Mateus is a 39 y.o. female referred to outpatient Physical Therapy with a medical diagnosis of Cervical Radiculopathy. Pt presents with symptoms of Thoracic Outlet Syndrome being +Adson and +Liza tests with left significantly worse than right. Pt has normal ROM in cervical spine without pain. BUE ROM is also WNL, but strength of LUE is grossly 1/2 muscle grade weaker than RUE in C3-C7 nerve distribution. She has hypertonicity throughout eric neck and shoulders with left side worse than right. She has multiple postural deficits.    Pt prognosis is Good.   Pt will benefit from skilled outpatient Physical Therapy to address the deficits stated above and in the chart below, provide pt/family education, and to maximize pt's level of independence.     Plan of care discussed with patient: Yes  Pt's spiritual, cultural and educational needs considered and patient is agreeable to the plan of care and goals as stated below:     Anticipated Barriers for therapy: none    Decision Making/ Complexity Score:  high    Goals:    Short Term Goals: 3 weeks   Pt indep with HEP.  Pt's pain in neck/LUE will be no higher than 6/10.  Pt will have 4/5 strength in C3 and C7 nerve distribution.    Long Term Goals: 6 weeks   Pt indep with self-treatment techniques.  Pt's pain in neck/LUE will be no higher than 2/10.  Pt will have 4+/5 strength in C5-C7 nerve distribution.  Pt will have a negative result for Adson test LUE.    Plan     Plan of care Certification: 7/3/2024 to *8/23/2024.    Outpatient Physical Therapy 2 times weekly for 6 weeks to include the following interventions: Electrical Stimulation IFC, Manual Therapy, Moist Heat/ Ice, Patient Education, Therapeutic Exercise, and Ultrasound.     Clinical Information for Insurance Authorization     Dates of Services: 7/3/2024 to 8/23/2024.  Discharge Plan: Patient will be discharged from skilled physical therapy treatment once all goals have been met, patient has plateaued, or physician/insurance requests discontinuation of care. Patient will be discharged with a home exercise program.  Diagnosis code(s): M54.12  Condition or diagnosis requiring therapy: Neurological  Objective measurements documented in evaluation/re-evaluation: Functional mobility, Motor function (includes motor weakness or paralysis), and Pain or hypersensitivity (includes headache and extremity pain)     Total visits requested: 12  CPT Codes and Number of Units Requested:  30652 [attended electrical stimulation]  Total units: 12  1 unit(s)/visit x 12 visit(s)  Treatment frequency: 2 time(s)/week x 12 week(s)  52559 [manual therapy]  Total units: 36  3 unit(s)/visit x 12 visit(s)  Treatment frequency: 2 time(s)/week x 12 week(s)  72993 [ultrasound]  Total units: 12  1 unit(s)/visit x 12 visit(s)  Treatment frequency: 2 time(s)/week x 12 week(s)  30890 [therapeutic exercise]  Total units: 36  3 unit(s)/visit x 12 visit(s)  Treatment frequency: 2 time(s)/week x 12 week(s)          Harper Evans PT  [facility]  Physical Therapy Department      I CERTIFY THE NEED FOR THESE SERVICES FURNISHED UNDER THIS PLAN OF TREATMENT AND WHILE UNDER MY CARE.      Physician's Signature: _________________________________________  Date: __________________________

## 2024-07-03 NOTE — PROGRESS NOTES
"[facility]  Physical Therapy Initial Evaluation    Name: Mateus Ballesteros  Clinic Number: [id number]    Therapy Diagnosis: No diagnosis found.  Physician: Fer Barnes MD    Physician Orders: PT Eval and Treat   Medical Diagnosis from Referral: Cervical Radiculopathy  Evaluation Date: 7/3/2024  Authorization Period Expiration: ***  Visit # / Visits authorized: 1/ 13    Time In: 0816  Time Out: 0911  Total Appointment Time (timed & untimed codes): 55 minutes    Precautions: Standard and CHF    Subjective     Date of onset: ***  History of current condition - Mateus reports: ***     Medical History:   Past Medical History:   Diagnosis Date    CHF (congestive heart failure)     Hypertension    Pt is newly diagnosed with lymphadenopathy.    Surgical History:   Mateus Ballesteros had a recent biopsy of lymph node.    Medications:   Mateus has a current medication list which includes the following prescription(s): diclofenac sodium, doxycycline, fluconazole, hydrochlorothiazide, and losartan.    Allergies:   Review of patient's allergies indicates:   Allergen Reactions    Penicillins Nausea And Vomiting     Per pt made her "foam at the mouth"      Sulfa (sulfonamide antibiotics) Rash        Imaging:   MRI 5/13/2024 of brain: negative for infarction or intracranial hemorrhage.  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    Objective     Posture:   Forward head: yes  Thoracic curve: WF;  Cervical curve: decreased  Laterally flexed: left and slightly  Rotated: left  Rounded shoulders: yes  Scoliosis:   Shoulder height: increased  Clavical height: left  Comments:    Cervical range of motion:  Cervical AROM Pain/Dysfunction with Movement   Flexion 100% NO   Extension 100% NO   Right lateral flexion 100% NO   Left lateral flexion 100% NO   Right rotation 100% NO   Left rotation 100% NO     Cervical manual muscle test:    Right  Left    C1-C2 Chin up MMT strength: 4/5 MMT strength: 4/5   C1-C2 Chin in MMT shoulder: 4/5 MMT strength: 4/5   C3 Lateral flexion MMT strength: 4/5 MMT strength: 4-/5   C4 Shoulder Shrug MMT strength: 4/5 MMT strength: 4/5   C5 Biceps MMT strength: 4+/5 MMT strength: 4/5   C6 Wrist extension MMT strength: 4+/5 MMT strength: 4/5   C7 Triceps MMT strength: 4+/5 MMT strength: 4-/5     Shoulder range of motion:    Right  Left    Flexion (180) WFL WFL   Internal rotation (45) WFL WFL   External rotation (45) WFL WFL   Abduction (180) WFL WFL   Other       Segment/Mobility:     Clinical Special Tests:   Compression test: Negative  Thoracic outlet: Positive left side, (Adson) Negative right side; Liza test: +BUE  Distraction: Negative  Vertebral artery: right ; left   Alar ligament:   Scapular realignment:     Palpation:   Body side Increased tone   Sternocleidomastoid {RIGHT/LEFT:88576} {YES/NO:20267}   Scalenes {RIGHT/LEFT:81913} {YES/NO:20267}   1st rib {RIGHT/LEFT:11231} {YES/NO:20267}   Upper traps {RIGHT/LEFT:59868} {YES/NO:20267}   Suboccipitals {RIGHT/LEFT:89460} {YES/NO:20267}   Transverse process {RIGHT/LEFT:93778} {YES/NO:20267}   Spinous process {RIGHT/LEFT:97638} {YES/NO:20267}   Pec Minor {RIGHT/LEFT:37255} {YES/NO:20267}   Masseter {RIGHT/LEFT:05165} {YES/NO:20267}   Mastoid process {RIGHT/LEFT:25544} {YES/NO:20267}   Levator Scapula {RIGHT/LEFT:21194}  "{YES/NO:20267}     Comments:    Limitation/Restriction for FOTO Intake Survey    Therapist reviewed FOTO scores for Mateus Ballesteros on 7/3/2024.   FOTO documents entered into [employer] - see Media section.    Limitation Score: ***%       TREATMENT     Home Exercises and Patient Education Provided    Education provided:   ***    Written Home Exercises Provided: yes.  Exercises were reviewed and Mateus was able to demonstrate them prior to the end of the session.  Mateus demonstrated fair  understanding of the education provided.     See EMR under Media for exercises provided {Blank single:21391::"7/3/2024","prior visit"}.    Assessment     Mateus is a 39 y.o. female referred to outpatient Physical Therapy with a medical diagnosis of ***. Pt presents with ***    Pt prognosis is {REHAB PROGNOSIS OHS:86966}.   Pt will benefit from skilled outpatient Physical Therapy to address the deficits stated above and in the chart below, provide pt/family education, and to maximize pt's level of independence.     Plan of care discussed with patient: {YES:93925}  Pt's spiritual, cultural and educational needs considered and patient is agreeable to the plan of care and goals as stated below:     Anticipated Barriers for therapy: none    Decision Making/ Complexity Score: high    Goals:    Short Term Goals: *** weeks   ***    Long Term Goals: *** weeks   ***    Plan     Plan of care Certification: 7/3/2024 to ***.    Outpatient Physical Therapy 2 times weekly for 6 weeks to include the following interventions: {[place] PLAN:75585}.       Harper Evans, PT  [facility]  Physical Therapy Department      I CERTIFY THE NEED FOR THESE SERVICES FURNISHED UNDER THIS PLAN OF TREATMENT AND WHILE UNDER MY CARE.      Physician's Signature: _________________________________________  Date: __________________________     "

## 2024-07-09 ENCOUNTER — CLINICAL SUPPORT (OUTPATIENT)
Dept: REHABILITATION | Facility: HOSPITAL | Age: 39
End: 2024-07-09
Payer: MEDICAID

## 2024-07-09 DIAGNOSIS — M54.12 CERVICAL RADICULOPATHY: Primary | ICD-10-CM

## 2024-07-09 PROCEDURE — 97035 APP MDLTY 1+ULTRASOUND EA 15: CPT

## 2024-07-09 PROCEDURE — 97140 MANUAL THERAPY 1/> REGIONS: CPT

## 2024-07-09 PROCEDURE — 97110 THERAPEUTIC EXERCISES: CPT

## 2024-07-09 NOTE — PROGRESS NOTES
"  Physical Therapy Treatment Note     Name: Mateus Ballesteros  Clinic Number: [id number]    Therapy Diagnosis: No diagnosis found.  Physician: Fer Barnes MD    Visit Date: 7/9/2024    Physician Orders: PT Eval and Treat   Medical Diagnosis from Referral: Cervical Radiculopathy (M54.12)   Evaluation Date: 7/3/2024   Authorization Period Expiration: 8/16/2024     Visit # / Visits authorized: 2/13   PTA Visit #: 0/5    Time In: 1103  Time Out: 1155  Total Billable Time: 55 minutes    Precautions:   Standard and CHF  Functional Level Prior to Evaluation: Pt has had multiple years of LUE pain, numbness and tingling.     Subjective     Pt reports: Pt reports less pain and for past 2 days she has not had the numbness and tingling in LUE.  She was compliant with home exercise program.  Response to previous treatment: Felt a little better.  Functional change: Pt reports decreased LUE numbness and tingling.    Pain: 4/10  Location: central lower neck /upper thoracic extending to left side of neck to shldr down inside arm to finger tips       Objective     Mateus received therapeutic exercises to develop strength, ROM, flexibility, and posture for 10 minutes including: reassessing symptoms    Stretch: eric Scalene 10" x 2 reps each side   Stretch: Corner Pectoral stretch (Not today)   Scapular Retractions, seated (Not today)   Scapular Retractions, prone (Peter Diaz) (Not today)   Scalene Towel Stretch (Not today)   Stretch: Upper Trap, seated (Not today)                                                                                   Mateus received the following manual therapy techniques: Joint mobilizations, Manual traction, Myofacial release, Soft tissue Mobilization, and TPR were applied to the: eric Scalenes, eric SCM, eric UT for 35 minutes, including:    Mateus participated in neuromuscular re-education activities to improve: Posture for 0 minutes. The following activities were included:    Mateus received the " following direct contact modalities after being cleared for contraindications: Ultrasound:  Mateus received ultrasound to manage pain and inflammation at 100 % duty cycle applied to the eric Scalenes and eric Upper Trapezius at 1.5 W/cm2  for a duration of 8 minutes/10 minutes clean up. Patient tolerated treatment well without adverse effects. Therapist was in attendance throughout intervention.    Mateus received the following supervised modalities after being cleared for contradictions: IFC Electrical Stimulation:  Mateus received IFC Electrical Stimulation for pain control applied to the xx. Pt received stimulation at 100 % scan at a frequency of xx for 0 minutes. Mateus tolderated treatment well without any adverse effects.      Mateus received hot pack for 0 minutes to XX.    Mateus received cold pack for 0 minutes to XX.      Home Exercises Provided and Patient Education Provided     Education provided: cont with Scalene stretches    Written Home Exercises Provided: Patient instructed to cont prior HEP.  Exercises were reviewed and Mateus was able to demonstrate them prior to the end of the session.  Mateus demonstrated good  understanding of the education provided.     See EMR under Media for exercises provided prior visit.    Assessment     Pt has not had numbness and tingling down LUE in past 2 days after performing Scalene stretching daily since eval.    Mateus Is progressing well towards her goals.   Pt prognosis is Good.     Pt will continue to benefit from skilled outpatient physical therapy to address the deficits listed in the problem list box on initial evaluation, provide pt/family education and to maximize pt's level of independence in the home and community environment.     Pt's spiritual, cultural and educational needs considered and pt agreeable to plan of care and goals.     Anticipated barriers to physical therapy: none    Goals:  Short Term Goals: 3 weeks   Pt indep with HEP.  Pt's  pain in neck/LUE will be no higher than 6/10.  Pt will have 4/5 strength in C3 and C7 nerve distribution.     Long Term Goals: 6 weeks   Pt indep with self-treatment techniques.  Pt's pain in neck/LUE will be no higher than 2/10.  Pt will have 4+/5 strength in C5-C7 nerve distribution.  Pt will have a negative result for Adson test LUE.    Plan     NEXT VISIT: STM/TPR to neck in supine; pectoral release eric. Issue HEP (pectoral corner stretch, scap retraction, scalene towel stretch, UT stretch.)    Plan of care Certification: 7/3/2024 to 8/16/2024.     Outpatient Physical Therapy 2 times weekly for 6 weeks to include the following interventions: Electrical Stimulation IFC, Manual Therapy, Moist Heat/ Ice, Patient Education, Therapeutic Exercise, and Ultrasound.        Harper Evans, PT  7/9/2024

## 2024-07-11 ENCOUNTER — CLINICAL SUPPORT (OUTPATIENT)
Dept: REHABILITATION | Facility: HOSPITAL | Age: 39
End: 2024-07-11
Payer: MEDICAID

## 2024-07-11 DIAGNOSIS — M54.12 CERVICAL RADICULOPATHY: Primary | ICD-10-CM

## 2024-07-11 PROCEDURE — 97110 THERAPEUTIC EXERCISES: CPT | Mod: CQ

## 2024-07-11 NOTE — PROGRESS NOTES
"  Physical Therapy Treatment Note     Name: Mateus Ballesteros  Clinic Number: [id number]    Therapy Diagnosis: No diagnosis found.  Physician: Fer Barnes MD    Visit Date: 7/11/2024    Physician Orders: PT Eval and Treat   Medical Diagnosis from Referral: Cervical Radiculopathy (M54.12)   Evaluation Date: 7/3/2024   Authorization Period Expiration: 8/16/2024     Visit # / Visits authorized: 3/13   PTA Visit #: 1/5    Time In: 1109  Time Out: 1148  Total Billable Time:  minutes    Precautions:   Standard and CHF  Functional Level Prior to Evaluation: Pt has had multiple years of LUE pain, numbness and tingling.     Subjective     Pt reports: Pt reported that she still had L tingling pain in her arm when she drives; she stated that yesterday in was in both arms.   She was compliant with home exercise program.  Response to previous treatment: Patient reported that she felt good.   Functional change: Pt reports decreased LUE numbness and tingling.    Pain: 3/10  Location: L upper trap extending to shoulder      Objective     Mateus received therapeutic exercises to develop strength, ROM, flexibility, and posture for 31 minutes including:     Stretch: eric Scalene 20" x 2 reps each side   Stretch: Corner Pectoral stretch 30" x2   Scapular Retractions, seated 15x with 3" hold    Scapular Retractions, prone (Rui Diaz) (Not today)   Scalene Towel Stretch (Not today)   Stretch: Upper Trap, seated 30" x2 on each side                                                                                    Mateus received the following manual therapy techniques: Joint mobilizations, Manual traction, Myofacial release, Soft tissue Mobilization, and TPR were applied to the: eric Scalenes, eric SCM, eric UT for 0 minutes, including: (Not today)     Mateus participated in neuromuscular re-education activities to improve: Posture for 0 minutes. The following activities were included: (Not today)     Mateus received the following direct " contact modalities after being cleared for contraindications: Ultrasound:  Mateus received ultrasound to manage pain and inflammation at 100 % duty cycle applied to the eric Scalenes and eric Upper Trapezius at 1.5 W/cm2  for a duration of 0 minutes. Patient tolerated treatment well without adverse effects. Therapist was in attendance throughout intervention. (Not today)     Mateus received the following supervised modalities after being cleared for contradictions: IFC Electrical Stimulation:  Mateus received IFC Electrical Stimulation for pain control applied to the xx. Pt received stimulation at 100 % scan at a frequency of xx for 0 minutes. Mateus tolderated treatment well without any adverse effects.      Mateus received hot pack for approx. 8 minutes to L upper trap.    Mateus received cold pack for 0 minutes to XX.      Home Exercises Provided and Patient Education Provided     Education provided: cont with Scalene stretches    Written Home Exercises Provided: Patient instructed to cont prior HEP.  Exercises were reviewed and Mateus was able to demonstrate them prior to the end of the session.  Mateus demonstrated good  understanding of the education provided.     See EMR under Media for exercises provided prior visit.    Assessment     Patient was provided with visual and verbal cues to promote proper form and hold times of treatment tasks. Patient with no noted adverse effects to treatment.     Mateus Is progressing well towards her goals.   Pt prognosis is Good.     Pt will continue to benefit from skilled outpatient physical therapy to address the deficits listed in the problem list box on initial evaluation, provide pt/family education and to maximize pt's level of independence in the home and community environment.     Pt's spiritual, cultural and educational needs considered and pt agreeable to plan of care and goals.     Anticipated barriers to physical therapy: none    Goals:  Short Term Goals:  3 weeks   Pt indep with HEP.  Pt's pain in neck/LUE will be no higher than 6/10.  Pt will have 4/5 strength in C3 and C7 nerve distribution.     Long Term Goals: 6 weeks   Pt indep with self-treatment techniques.  Pt's pain in neck/LUE will be no higher than 2/10.  Pt will have 4+/5 strength in C5-C7 nerve distribution.  Pt will have a negative result for Adson test LUE.    Plan     Plan of care Certification: 7/3/2024 to 8/16/2024.     Outpatient Physical Therapy 2 times weekly for 6 weeks to include the following interventions: Electrical Stimulation IFC, Manual Therapy, Moist Heat/ Ice, Patient Education, Therapeutic Exercise, and Ultrasound.        Zoraida Martin, PTA  7/11/2024

## 2024-07-16 ENCOUNTER — CLINICAL SUPPORT (OUTPATIENT)
Dept: REHABILITATION | Facility: HOSPITAL | Age: 39
End: 2024-07-16
Payer: MEDICAID

## 2024-07-16 DIAGNOSIS — M54.12 CERVICAL RADICULOPATHY: Primary | ICD-10-CM

## 2024-07-16 PROCEDURE — 97035 APP MDLTY 1+ULTRASOUND EA 15: CPT

## 2024-07-16 PROCEDURE — 97110 THERAPEUTIC EXERCISES: CPT

## 2024-07-16 PROCEDURE — 97140 MANUAL THERAPY 1/> REGIONS: CPT

## 2024-07-16 NOTE — PROGRESS NOTES
"  Physical Therapy Treatment Note     Name: Mateus Ballesteros  [facility] Number: [id number]    Therapy Diagnosis: No diagnosis found.  Physician: Fer Barnes MD    Visit Date: 7/16/2024    Physician Orders: PT Eval and Treat   Medical Diagnosis from Referral: Cervical Radiculopathy (M54.12)   Evaluation Date: 7/3/2024   Authorization Period Expiration: 8/16/2024     Visit # / Visits authorized: 4/13   PTA Visit #: 0/5    Time In: 1104  Time Out: 1204  Total Billable Time: 60 minutes    Precautions:   Standard and CHF  Functional Level Prior to Evaluation: Pt has had multiple years of LUE pain, numbness and tingling.     Subjective     Pt reports: Pt reported that she has no tingling pain in LUE. States LUE pain increased because she slept on left side last night. States she wonders if pain in LUE is due to her diagnosis of cervical radiculopathy.    She was compliant with home exercise program.  Response to previous treatment: Patient reported that she felt good.   Functional change: Pt reports decreased LUE numbness and tingling.    Pain: 4/10  Location: L upper trap extending to shoulder extending to elbow      Objective     Mateus received therapeutic exercises to develop strength, ROM, flexibility, and posture for 32 minutes including:     *Stretch: eric Scalene 20" x 2 reps, eric   *Stretch: Corner Pectoral stretch-90 elbow 10" x 3 reps   *Scapular Retractions, seated 5" x 20 reps   *Scapular Retractions, prone (Peter Pan) 5" x 10 reps, eric   *Scalene Towel Stretch, eric 30" x 3 reps, erci   *Stretch: Upper Trap, seated eric 10" x 3 reps, eric    *Seated Cervical Rotation, eric 10"x 3 reps, eric                                                                               Mateus received the following manual therapy techniques: TPR were applied to the: eric Rhomboids, eric SCM for 15 minutes, including:      Mateus participated in neuromuscular re-education activities to improve: Posture for 5 minutes. The following " activities were included: cervical ext with sustained hold, cervical rot with sustained hold    Mateus received the following direct contact modalities after being cleared for contraindications: Ultrasound:  Mateus received ultrasound to manage pain and inflammation at 100 % duty cycle applied to the eric Scalenes and eric Upper Trapezius at 1.5 W/cm2  for a duration of 7 minutes/8 min total. Patient tolerated treatment well without adverse effects. Therapist was in attendance throughout intervention.     Mateus received the following supervised modalities after being cleared for contradictions: IFC Electrical Stimulation:  Mateus received IFC Electrical Stimulation for pain control applied to the xx. Pt received stimulation at 100 % scan at a frequency of xx for 0 minutes. Mateus tolderated treatment well without any adverse effects.      Mateus received hot pack for approx. 0 minutes to xx.    Mateus received cold pack for 0 minutes to XX.      Home Exercises Provided and Patient Education Provided     Education provided: TOS HEP. Pt instructed in proper sitting and standing posture of shoulders to avoid scapular protraction.    Written Home Exercises Provided: yes. TOS HEP.   Exercises were reviewed and Mateus was able to demonstrate them prior to the end of the session.  Mateus demonstrated good  understanding of the education provided.     See EMR under Media for exercises provided prior visit.    Assessment     Patient was issued a TOS HEP this visit. LUE pain decreased from 4/10 to 3/10 post-treatment. PT had pt move head in different positions to try to find any cervical bulged disc that referred pain to LUE since pt questioned whether her LUE pain was d/t a bulged cervical disc, but we were unable to find any that reproduced her pain.    Mateus Is progressing well towards her goals.   Pt prognosis is Good.     Pt will continue to benefit from skilled outpatient physical therapy to address the  deficits listed in the problem list box on initial evaluation, provide pt/family education and to maximize pt's level of independence in the home and community environment.     Pt's spiritual, cultural and educational needs considered and pt agreeable to plan of care and goals.     Anticipated barriers to physical therapy: none    Goals:  Short Term Goals: 3 weeks   Pt indep with HEP.-PROGRESSED  Pt's pain in neck/LUE will be no higher than 6/10.  Pt will have 4/5 strength in C3 and C7 nerve distribution.     Long Term Goals: 6 weeks   Pt indep with self-treatment techniques.  Pt's pain in neck/LUE will be no higher than 2/10.  Pt will have 4+/5 strength in C5-C7 nerve distribution.  Pt will have a negative result for Adson test LUE.    Plan     NEXT VISIT: STM/TPR to neck in supine; pectoral release eric.     Plan of care Certification: 7/3/2024 to 8/16/2024.     Outpatient Physical Therapy 2 times weekly for 6 weeks to include the following interventions: Electrical Stimulation IFC, Manual Therapy, Moist Heat/ Ice, Patient Education, Therapeutic Exercise, and Ultrasound.        Harper Evans, PT  7/16/2024             Siliq Counseling:  I discussed with the patient the risks of Siliq including but not limited to new or worsening depression, suicidal thoughts and behavior, immunosuppression, malignancy, posterior leukoencephalopathy syndrome, and serious infections.  The patient understands that monitoring is required including a PPD at baseline and must alert us or the primary physician if symptoms of infection or other concerning signs are noted. There is also a special program designed to monitor depression which is required with Siliq.

## 2024-07-18 ENCOUNTER — CLINICAL SUPPORT (OUTPATIENT)
Dept: REHABILITATION | Facility: HOSPITAL | Age: 39
End: 2024-07-18
Payer: MEDICAID

## 2024-07-18 DIAGNOSIS — M54.12 CERVICAL RADICULOPATHY: Primary | ICD-10-CM

## 2024-07-18 PROCEDURE — 97110 THERAPEUTIC EXERCISES: CPT

## 2024-07-18 PROCEDURE — 97140 MANUAL THERAPY 1/> REGIONS: CPT

## 2024-07-18 NOTE — PROGRESS NOTES
"  Physical Therapy Treatment Note     Name: Mateus Ballesteros  [facility] Number: [id number]    Therapy Diagnosis: No diagnosis found.  Physician: Fer Barnes MD    Visit Date: 7/18/2024    Physician Orders: PT Eval and Treat   Medical Diagnosis from Referral: Cervical Radiculopathy (M54.12)   Evaluation Date: 7/3/2024   Authorization Period Expiration: 8/16/2024     Visit # / Visits authorized: 5/13   PTA Visit #: 0/5    Time In: 1104  Time Out: 1142  Total Billable Time: 38 minutes    Precautions:   Standard and CHF  Functional Level Prior to Evaluation: Pt has had multiple years of LUE pain, numbness and tingling.     Subjective     Pt reports: Pt reported that she has no more tingling in LUE. No reported pain today. States she likes for PT to alternate using US and MH to neck/shldrs. She states neck "feels looser" after treatment.    She was compliant with home exercise program.  Response to previous treatment: Patient reported that she felt good.   Functional change: Pt reports no LUE numbness and tingling.    Pain: 0/10  Location: L upper trap extending to shoulder extending to elbow      Objective     Mateus received therapeutic exercises to develop strength, ROM, flexibility, and posture for 8 minutes including:     *Stretch: eric Scalene 20" x 2 reps, eric (not today)   *Stretch: Corner Pectoral stretch-90 elbow 10" x 3 reps (not today)   *Scapular Retractions, seated 5" x 20 reps (not today)   *Scapular Retractions, prone (Peter Pan) 5" x 10 reps, eric (not today)   *Scalene Towel Stretch, eric 30" x 3 reps, eric (not today)   *Stretch: Upper Trap, seated eric 10" x 3 reps, eric (not today)   *Seated Cervical Rotation, eric 10"x 3 reps, eric(not today)       Passive stretch eric UT in supine 1x30"   Passive stretch eric Pectorals in supine 2x30"   Occipivot Pad                                                                Mateus received the following manual therapy techniques: STM/TPR were applied to the: eric neck in " c-nilsa, Scalenes, SCM, UT, suboccipital. Pectoral release eric, passive stretching eric UT in supine for total of  25 minutes, including:      Mateus participated in neuromuscular re-education activities to improve: Posture for x minutes. The following activities were included: cervical ext with sustained hold, cervical rot with sustained hold. (Not today)    Mateus received the following direct contact modalities after being cleared for contraindications: Ultrasound:  Mateus received ultrasound to manage pain and inflammation at 100 % duty cycle applied to the eric Scalenes and eric Upper Trapezius at 1.5 W/cm2  for a duration of 7 minutes/8 min total. Patient tolerated treatment well without adverse effects. Therapist was in attendance throughout intervention. (Not today)    Mateus received the following supervised modalities after being cleared for contradictions: IFC Electrical Stimulation:  Mateus received IFC Electrical Stimulation for pain control applied to the xx. Pt received stimulation at 100 % scan at a frequency of xx for 0 minutes. Mateus tolderated treatment well without any adverse effects.      Mateus received hot pack for approx. 8 minutes to eric UT in sitting.    Mateus received cold pack for 0 minutes to XX.      Home Exercises Provided and Patient Education Provided     Education provided: positioning with pillows in bed.    Written Home Exercises Provided: Patient instructed to cont prior HEP. .   Exercises were reviewed and Mateus was able to demonstrate them prior to the end of the session.  Mateus demonstrated good  understanding of the education provided.     See EMR under Media for exercises provided prior visit.    Assessment     Hypertonicity eric neck decreased post-treatment. Pt reported improved comfort.    Matues Is progressing well towards her goals.   Pt prognosis is Good.     Pt will continue to benefit from skilled outpatient physical therapy to address the deficits listed  in the problem list box on initial evaluation, provide pt/family education and to maximize pt's level of independence in the home and community environment.     Pt's spiritual, cultural and educational needs considered and pt agreeable to plan of care and goals.     Anticipated barriers to physical therapy: none    Goals:  Short Term Goals: 3 weeks   Pt indep with HEP.-PROGRESSED  Pt's pain in neck/LUE will be no higher than 6/10.-MET  Pt will have 4/5 strength in C3 and C7 nerve distribution.     Long Term Goals: 6 weeks   Pt indep with self-treatment techniques.  Pt's pain in neck/LUE will be no higher than 2/10.  Pt will have 4+/5 strength in C5-C7 nerve distribution.  Pt will have a negative result for Adson test LUE.    Plan     NEXT VISIT: STM/TPR to neck in supine; MMT C3 and C7 distribution, initiate use of Occipivot pad, instruct in use of Theracane, US to neck/shldrs.    Plan of care Certification: 7/3/2024 to 8/16/2024.     Outpatient Physical Therapy 2 times weekly for 6 weeks to include the following interventions: Electrical Stimulation IFC, Manual Therapy, Moist Heat/ Ice, Patient Education, Therapeutic Exercise, and Ultrasound.        Harper Evans, PT  7/18/2024

## 2024-07-23 ENCOUNTER — CLINICAL SUPPORT (OUTPATIENT)
Dept: REHABILITATION | Facility: HOSPITAL | Age: 39
End: 2024-07-23
Payer: MEDICAID

## 2024-07-23 DIAGNOSIS — M54.12 CERVICAL RADICULOPATHY: Primary | ICD-10-CM

## 2024-07-23 PROCEDURE — 97110 THERAPEUTIC EXERCISES: CPT

## 2024-07-23 PROCEDURE — 97140 MANUAL THERAPY 1/> REGIONS: CPT

## 2024-07-23 NOTE — PROGRESS NOTES
"  Physical Therapy Treatment Note     Name: Mateus Ballesteros  Clinic Number: [id number]    Therapy Diagnosis: No diagnosis found.  Physician: Fer Barnes MD    Visit Date: 7/23/2024    Physician Orders: PT Eval and Treat   Medical Diagnosis from Referral: Cervical Radiculopathy (M54.12)   Evaluation Date: 7/3/2024   Authorization Period Expiration: 8/16/2024     Visit # / Visits authorized: 6/13   PTA Visit #: 0/5    Time In: 1320  Time Out: 1402  Total Billable Time: 42 minutes    Precautions:   Standard and CHF  Functional Level Prior to Evaluation: Pt has had multiple years of LUE pain, numbness and tingling.     Subjective     Pt reports: Pt reports neck and shoulder feeling better.    She was compliant with home exercise program.  Response to previous treatment: Patient reported that she felt good.   Functional change: Pt reports no LUE numbness and tingling.    Pain: 0/10, 310  Location: L upper trap extending to shoulder extending to elbow , left side of neck     Objective     Mateus received therapeutic exercises to develop strength, ROM, flexibility, and posture for 10 minutes including:     *Stretch: eric Scalene 20" x 2 reps, eric (not today)   *Stretch: Corner Pectoral stretch-90 elbow 10" x 3 reps (not today)   *Scapular Retractions, seated 5" x 20 reps (not today)   *Scapular Retractions, prone (Peter Pan) 5" x 10 reps, eric (not today)   *Scalene Towel Stretch, eric 30" x 3 reps, eric (not today)   *Stretch: Upper Trap, seated eric 10" x 3 reps, eric (not today)   *Seated Cervical Rotation, eric 10"x 3 reps, eric(not today)       Passive stretch eric UT in supine 1x30" (not today)   Passive stretch eric Pectorals in supine 2x30" (not today)   Occipivot Pad in supine 5 minutes   Chin Tucks 20x in supine                                                           Mateus received the following manual therapy techniques: STM/TPR were applied to the: eric neck in c-nilsa, Scalenes, SCM, UT, suboccipital, eric UT in " supine for total of  32 minutes, including:      Mateus participated in neuromuscular re-education activities to improve: Posture for x minutes. The following activities were included: cervical ext with sustained hold, cervical rot with sustained hold. (Not today)    Mateus received the following direct contact modalities after being cleared for contraindications: Ultrasound:  Mateus received ultrasound to manage pain and inflammation at 100 % duty cycle applied to the eric Scalenes and eric Upper Trapezius at 1.5 W/cm2  for a duration of 7 minutes/8 min total. Patient tolerated treatment well without adverse effects. Therapist was in attendance throughout intervention. (Not today)    Mateus received the following supervised modalities after being cleared for contradictions: IFC Electrical Stimulation:  Mateus received IFC Electrical Stimulation for pain control applied to the xx. Pt received stimulation at 100 % scan at a frequency of xx for 0 minutes. Mateus tolderated treatment well without any adverse effects.  (not today)    Mateus received hot pack for approx. 8 minutes to eric UT in sitting.    Mateus received cold pack for 0 minutes to XX.      Home Exercises Provided and Patient Education Provided     Education provided:     Written Home Exercises Provided: Patient instructed to cont prior HEP.    Exercises were reviewed and Mateus was able to demonstrate them prior to the end of the session.  Mateus demonstrated good  understanding of the education provided.     See EMR under Media for exercises provided prior visit.    Assessment     Hypertonicity eric neck decreased post-treatment. Pt reported no pain in neck or shldr post-treatment.    Mateus Is progressing well towards her goals.   Pt prognosis is Good.     Pt will continue to benefit from skilled outpatient physical therapy to address the deficits listed in the problem list box on initial evaluation, provide pt/family education and to maximize  pt's level of independence in the home and community environment.     Pt's spiritual, cultural and educational needs considered and pt agreeable to plan of care and goals.     Anticipated barriers to physical therapy: none    Goals:  Short Term Goals: 3 weeks   Pt indep with HEP.-PROGRESSED  Pt's pain in neck/LUE will be no higher than 6/10.-MET  Pt will have 4/5 strength in C3 and C7 nerve distribution.     Long Term Goals: 6 weeks   Pt indep with self-treatment techniques.  Pt's pain in neck/LUE will be no higher than 2/10.  Pt will have 4+/5 strength in C5-C7 nerve distribution.  Pt will have a negative result for Adson test LUE.    Plan     NEXT VISIT: STM/TPR to neck in supine; MMT C3 and C7 distribution,  instruct in use of Theracane, US to neck/shldrs.    Plan of care Certification: 7/3/2024 to 8/16/2024.     Outpatient Physical Therapy 2 times weekly for 6 weeks to include the following interventions: Electrical Stimulation IFC, Manual Therapy, Moist Heat/ Ice, Patient Education, Therapeutic Exercise, and Ultrasound.        Harper Evans, PT  7/23/2024

## 2024-07-25 ENCOUNTER — CLINICAL SUPPORT (OUTPATIENT)
Dept: REHABILITATION | Facility: HOSPITAL | Age: 39
End: 2024-07-25
Payer: MEDICAID

## 2024-07-25 DIAGNOSIS — M54.12 CERVICAL RADICULOPATHY: Primary | ICD-10-CM

## 2024-07-25 PROCEDURE — 97140 MANUAL THERAPY 1/> REGIONS: CPT

## 2024-07-25 PROCEDURE — 97110 THERAPEUTIC EXERCISES: CPT

## 2024-07-25 PROCEDURE — 97035 APP MDLTY 1+ULTRASOUND EA 15: CPT

## 2024-07-25 NOTE — PROGRESS NOTES
"  Physical Therapy Treatment Note     Name: Mateus Ballesteros  Clinic Number: [id number]    Therapy Diagnosis: No diagnosis found.  Physician: Fer Barnes MD    Visit Date: 7/25/2024    Physician Orders: PT Eval and Treat   Medical Diagnosis from Referral: Cervical Radiculopathy (M54.12)   Evaluation Date: 7/3/2024   Authorization Period Expiration: 8/16/2024     Visit # / Visits authorized: 7/13   PTA Visit #: 0/5    Time In: 0805  Time Out: 0905  Total Billable Time: 60 minutes    Precautions:   Standard and CHF  Functional Level Prior to Evaluation: Pt has had multiple years of LUE pain, numbness and tingling.     Subjective     Pt reports: Pt reports neck and shoulder hurting more today d/t "I think it's the way I slept." States she hasn't been doing HEP daily.    She was not compliant with home exercise program.  Response to previous treatment: Patient reported that she felt good.   Functional change: Pt reports no LUE numbness and tingling.    Pain: 3/10  Location: Raymundo lateral neck and upper traps    Objective     Cervical manual muscle test:     Right EVAL              7/25/24 Left  EVAL               7/25/24   C1-C2 Chin up MMT strength: 4/5     4+/5 MMT strength: 4/5     4+/4   C1-C2 Chin in MMT shoulder: 4/5    4+/5  MMT strength: 4/5     4+/5   C3 Lateral flexion MMT strength: 4/5     4+/5 MMT strength: 4-/5    4+/5   C4 Shoulder Shrug MMT strength: 4/5     4+/5 MMT strength: 4/5     4+/5   C5 Biceps MMT strength: 4+/5   4+/5 MMT strength: 4/5     4+/5   C6 Wrist extension MMT strength: 4+/5   5/5 MMT strength: 4/5     5/5   C7 Triceps MMT strength: 4+/5   5/5 MMT strength: 4-/5    5/5       Mateus received therapeutic exercises to develop strength, ROM, flexibility, and posture for 25 minutes including: Reassessing Cervical MMT    *Stretch: raymundo Scalene 20" x 2 reps, raymundo (not today)   *Stretch: Corner Pectoral stretch-90 elbow 10" x 3 reps    *Scapular Retractions, seated 5" x 20 reps    *Scapular " "Retractions, prone (Peter Pan) 5" x 10 reps, eric (not today)   *Scalene Towel Stretch, eric 30" x 2 reps, eric    *Stretch: Upper Trap, seated eric 10" x 3 reps, eric    *Seated Cervical Rotation, eric 10"x 3 reps, eric       Passive stretch eric UT in supine 1x30" (not today)   Passive stretch eric Pectorals in supine 2x30" (not today)   Occipivot Pad in supine 5 minutes (not today)   Chin Tucks 20x in supine (not today)                                                           Mateus received the following manual therapy techniques: STM/TPR were applied to the: eric neck in Scalenes, SCM, UT,  eric UT in sitting for total of  20 minutes, including:  instruction in use of thera cane.    Mateus participated in neuromuscular re-education activities to improve: Posture for x minutes. The following activities were included: cervical ext with sustained hold, cervical rot with sustained hold. (Not today)    Mateus received the following direct contact modalities after being cleared for contraindications: Ultrasound:  Mateus received ultrasound to manage pain and inflammation at 100 % duty cycle applied to the eric Scalenes and eric Upper Trapezius at 1.5 W/cm2  for a duration of 11 minutes/15 min total. Patient tolerated treatment well without adverse effects. Therapist was in attendance throughout intervention.     Mateus received the following supervised modalities after being cleared for contradictions: IFC Electrical Stimulation:  Mateus received IFC Electrical Stimulation for pain control applied to the xx. Pt received stimulation at 100 % scan at a frequency of xx for 0 minutes. Mateus tolderated treatment well without any adverse effects.  (not today)    Mateus received hot pack for approx. 8 minutes to eric UT in sitting.(Not today)    Mateus received cold pack for 0 minutes to XX.      Home Exercises Provided and Patient Education Provided     Education provided: pt instructed in need to do HEP twice a day. " Instructed in use of thera cane.    Written Home Exercises Provided: Patient instructed to cont prior HEP.    Exercises were reviewed and Mateus was able to demonstrate them prior to the end of the session.  Mateus demonstrated good  understanding of the education provided.     See EMR under Media for exercises provided prior visit.    Assessment     Pt reported pain in eric neck and shldr post-treatment decreased to 2/10.    Mateus Is progressing well towards her goals.   Pt prognosis is Good.     Pt will continue to benefit from skilled outpatient physical therapy to address the deficits listed in the problem list box on initial evaluation, provide pt/family education and to maximize pt's level of independence in the home and community environment.     Pt's spiritual, cultural and educational needs considered and pt agreeable to plan of care and goals.     Anticipated barriers to physical therapy: none    Goals:  Short Term Goals: 3 weeks   Pt indep with HEP.-MET  Pt's pain in neck/LUE will be no higher than 6/10.-MET  Pt will have 4/5 strength in C3 and C7 nerve distribution.-MET     Long Term Goals: 6 weeks   Pt indep with self-treatment techniques.  Pt's pain in neck/LUE will be no higher than 2/10.  Pt will have 4+/5 strength in C5-C7 nerve distribution.-MET  Pt will have a negative result for Adson test LUE.    Plan     NEXT VISIT: STM/TPR to neck in supine    Plan of care Certification: 7/3/2024 to 8/16/2024.     Outpatient Physical Therapy 2 times weekly for 6 weeks to include the following interventions: Electrical Stimulation IFC, Manual Therapy, Moist Heat/ Ice, Patient Education, Therapeutic Exercise, and Ultrasound.        Harper Evans, PT  7/25/2024

## 2024-07-29 ENCOUNTER — CLINICAL SUPPORT (OUTPATIENT)
Dept: REHABILITATION | Facility: HOSPITAL | Age: 39
End: 2024-07-29
Payer: MEDICAID

## 2024-07-29 DIAGNOSIS — M54.12 CERVICAL RADICULOPATHY: Primary | ICD-10-CM

## 2024-07-29 PROCEDURE — 97140 MANUAL THERAPY 1/> REGIONS: CPT

## 2024-07-29 PROCEDURE — 97035 APP MDLTY 1+ULTRASOUND EA 15: CPT

## 2024-07-29 NOTE — PROGRESS NOTES
"  Physical Therapy Treatment Note     Name: Maetus Ballesteros  Clinic Number: [id number]    Therapy Diagnosis: No diagnosis found.  Physician: Fer Barnes MD    Visit Date: 7/29/2024    Physician Orders: PT Eval and Treat   Medical Diagnosis from Referral: Cervical Radiculopathy (M54.12)   Evaluation Date: 7/3/2024   Authorization Period Expiration: 8/16/2024     Visit # / Visits authorized: 8/13   PTA Visit #: 0/5    Time In: 0758  Time Out: 0856  Total Billable Time: 58 minutes    Precautions:   Standard and CHF  Functional Level Prior to Evaluation: Pt has had multiple years of LUE pain, numbness and tingling.     Subjective     Pt reports: Pt c/o sharp pain in right thoracic spine of 10/10. No c/o neck or shoulder apin    She was not compliant with home exercise program.  Response to previous treatment: Patient reported that her neck and shldrs feel "looser"  Functional change: Pt reports no LUE numbness and tingling.    Pain: 0/10, 10/10  Location: Raymundo lateral neck and upper traps, right mid-thoracic spine.    Objective       Mateus received therapeutic exercises to develop strength, ROM, flexibility, and posture for 0 minutes including: (not today)    *Stretch: raymundo Scalene 20" x 2 reps, raymundo (not today)   *Stretch: Corner Pectoral stretch-90 elbow 10" x 3 reps (not today)   *Scapular Retractions, seated 5" x 20 reps (not today)   *Scapular Retractions, prone (Peter Pan) 5" x 10 reps, raymundo (not today)   *Scalene Towel Stretch, raymundo 30" x 2 reps, raymundo (not today)   *Stretch: Upper Trap, seated raymundo 10" x 3 reps, raymundo (not today)   *Seated Cervical Rotation, rayumndo 10"x 3 reps, raymundo (not today)       Passive stretch raymundo UT in supine 1x30" (not today)   Passive stretch raymundo Pectorals in supine 2x30" (not today)   Occipivot Pad in supine 5 minutes (not today)   Chin Tucks 20x in supine (not today)                                                           Mateus received the following manual therapy techniques: STM/TPR were " applied to the: eric neck in Scalenes, SCM, UT,  suboccipitals in supine for 43 mnutes for total of  48 minutes, including:   use of thera cane x 5 minutes for indep TPR. P-A mobs to right thoracic spine only d/t rib hump with clockwise vertebral rotation f/b TPR to right thoracic paraspinals.    Mateus participated in neuromuscular re-education activities to improve: Posture for x minutes. The following activities were included: cervical ext with sustained hold, cervical rot with sustained hold. (Not today)    Mateus received the following direct contact modalities after being cleared for contraindications: Ultrasound:  Mateus received ultrasound to manage pain and inflammation at 100 % duty cycle applied to the eric Scalenes and eric Upper Trapezius at 1.5 W/cm2  for a duration of 7 minutes/10 min total. Patient tolerated treatment well without adverse effects. Therapist was in attendance throughout intervention.     Mateus received the following supervised modalities after being cleared for contradictions: IFC Electrical Stimulation:  Mateus received IFC Electrical Stimulation for pain control applied to the xx. Pt received stimulation at 100 % scan at a frequency of xx for 0 minutes. Mateus tolderated treatment well without any adverse effects.      Mateus received hot pack for approx. 8 minutes to mid back in sitting.    Mateus received cold pack for 0 minutes to XX.      Home Exercises Provided and Patient Education Provided     Education provided: pt instructed in thoracic spine counterclockwise rotation (to the left) with LTR to the right to realign thoracic spine.    Written Home Exercises Provided: Patient instructed to cont prior HEP.    Exercises were reviewed and Mateus was able to demonstrate them prior to the end of the session.  Mateus demonstrated good  understanding of the education provided.     See EMR under Media for exercises provided prior visit.    Assessment     Pt c/o severe right  mid-thoracic spine and had a slight right sided rib hump indicating clockwise vertebral rotation. Pain decreased in this area from 10/10 to 7/10 post-treatment. Neck and shoulder pain resolving.    Mateus Is progressing well towards her goals.   Pt prognosis is Good.     Pt will continue to benefit from skilled outpatient physical therapy to address the deficits listed in the problem list box on initial evaluation, provide pt/family education and to maximize pt's level of independence in the home and community environment.     Pt's spiritual, cultural and educational needs considered and pt agreeable to plan of care and goals.     Anticipated barriers to physical therapy: none    Goals:  Short Term Goals: 3 weeks   Pt indep with HEP.-MET  Pt's pain in neck/LUE will be no higher than 6/10.-MET  Pt will have 4/5 strength in C3 and C7 nerve distribution.-MET     Long Term Goals: 6 weeks   Pt indep with self-treatment techniques.  Pt's pain in neck/LUE will be no higher than 2/10.-MET  Pt will have 4+/5 strength in C5-C7 nerve distribution.-MET  Pt will have a negative result for Adson test LUE.    Plan     NEXT VISIT: STM/TPR to neck in supine, P-A mobs right t-spine with TPR.    Plan of care Certification: 7/3/2024 to 8/16/2024.     Outpatient Physical Therapy 2 times weekly for 6 weeks to include the following interventions: Electrical Stimulation IFC, Manual Therapy, Moist Heat/ Ice, Patient Education, Therapeutic Exercise, and Ultrasound.        Harper Evans, PT  7/29/2024

## 2024-07-30 ENCOUNTER — CLINICAL SUPPORT (OUTPATIENT)
Dept: REHABILITATION | Facility: HOSPITAL | Age: 39
End: 2024-07-30
Payer: MEDICAID

## 2024-07-30 DIAGNOSIS — M54.12 CERVICAL RADICULOPATHY: Primary | ICD-10-CM

## 2024-07-30 PROCEDURE — 97035 APP MDLTY 1+ULTRASOUND EA 15: CPT

## 2024-07-30 PROCEDURE — 97140 MANUAL THERAPY 1/> REGIONS: CPT

## 2024-07-30 NOTE — PROGRESS NOTES
"  Physical Therapy Treatment Note     Name: Mateus Ballesteros  Clinic Number: [id number]    Therapy Diagnosis: No diagnosis found.  Physician: Fer Barnes MD    Visit Date: 7/30/2024    Physician Orders: PT Eval and Treat   Medical Diagnosis from Referral: Cervical Radiculopathy (M54.12)   Evaluation Date: 7/3/2024   Authorization Period Expiration: 8/16/2024     Visit # / Visits authorized: 9/13   PTA Visit #: 0/5    Time In: 0803  Time Out: 0856  Total Billable Time: 53 minutes    Precautions:   Standard and CHF  Functional Level Prior to Evaluation: Pt has had multiple years of LUE pain, numbness and tingling.     Subjective     Pt reports: Pt reports thoracic pain not as bad as yesterday. States she has not performed trunk rotation ex shown yesterday to realign t-spine.    She was not compliant with home exercise program.  Response to previous treatment: Patient reported that her neck and shldrs feel "looser"  Functional change: Pt reports no LUE numbness and tingling.    Pain: 0/10, 6/10  Location: Raymundo lateral neck and upper traps, right mid-thoracic spine.    Objective       Mateus received therapeutic exercises to develop strength, ROM, flexibility, and posture for xx minutes including: (not today)    *Stretch: raymundo Scalene 20" x 2 reps, raymundo (not today)   *Stretch: Corner Pectoral stretch-90 elbow 10" x 3 reps (not today)   *Scapular Retractions, seated 5" x 20 reps (not today)   *Scapular Retractions, prone (Peter Pan) 5" x 10 reps, raymundo (not today)   *Scalene Towel Stretch, raymundo 30" x 2 reps, raymundo (not today)   *Stretch: Upper Trap, seated raymundo 10" x 3 reps, raymundo (not today)   *Seated Cervical Rotation, raymundo 10"x 3 reps, raymundo (not today)       Passive stretch raymundo UT in supine 1x30" (not today)   Passive stretch raymundo Pectorals in supine 2x30" (not today)   Occipivot Pad in supine 5 minutes (not today)   Chin Tucks 20x in supine (not today)                                                           Mateus received " "the following manual therapy techniques: STM/TPR were applied to the: eric neck in Scalenes, SCM, UT,  suboccipitals in supine  for total of  43 minutes, including:    P-A mobs to right thoracic spine only d/t rib hump with clockwise vertebral rotation f/b TPR to right thoracic paraspinals. Pt performed self-mobs with LTR right and rotating upper body left 3x10" holds with PT providing over pressure and pt pulling on edge of plinth.    Mateus participated in neuromuscular re-education activities to improve: Posture for x minutes. The following activities were included: cervical ext with sustained hold,  (Not today)    Mateus received the following direct contact modalities after being cleared for contraindications: Ultrasound:  Mateus received ultrasound to manage pain and inflammation at 100 % duty cycle applied to the right thoracis spine at 1.5 W/cm2  for a duration of 7 minutes/10 min total. Patient tolerated treatment well without adverse effects. Therapist was in attendance throughout intervention.     Mateus received the following supervised modalities after being cleared for contradictions: IFC Electrical Stimulation:  Mateus received IFC Electrical Stimulation for pain control applied to the xx. Pt received stimulation at 100 % scan at a frequency of xx for 0 minutes. Mateus tolderated treatment well without any adverse effects.  (Not today)    Mateus received hot pack for approx. 8 minutes to mid back and eric upper traps in sitting.    Mateus received cold pack for 0 minutes to XX.      Home Exercises Provided and Patient Education Provided     Education provided: pt instructed in thoracic spine counterclockwise rotation (to the left) with LTR to the right to realign thoracic spine several times a days to gradually realign t-spine.    Written Home Exercises Provided: Patient instructed to cont prior HEP.    Exercises were reviewed and Mateus was able to demonstrate them prior to the end of the " session.  Mateus demonstrated good  understanding of the education provided.     See EMR under Media for exercises provided prior visit.    Assessment     Pt c/o moderate right mid-thoracic spine today. Pain decreased in this area from 6/10 to 5/10 post-treatment. Neck and shoulder pain resolving. PT concerned that rotation of thoracic spine effecting neck.    Mateus Is progressing well towards her goals.   Pt prognosis is Good.     Pt will continue to benefit from skilled outpatient physical therapy to address the deficits listed in the problem list box on initial evaluation, provide pt/family education and to maximize pt's level of independence in the home and community environment.     Pt's spiritual, cultural and educational needs considered and pt agreeable to plan of care and goals.     Anticipated barriers to physical therapy: none    Goals:  Short Term Goals: 3 weeks   Pt indep with HEP.-MET  Pt's pain in neck/LUE will be no higher than 6/10.-MET  Pt will have 4/5 strength in C3 and C7 nerve distribution.-MET     Long Term Goals: 6 weeks   Pt indep with self-treatment techniques.  Pt's pain in neck/LUE will be no higher than 2/10.-MET  Pt will have 4+/5 strength in C5-C7 nerve distribution.-MET  Pt will have a negative result for Adson test LUE.    Plan     NEXT VISIT: STM/TPR to neck in supine, P-A mobs right t-spine with TPR. Repeat Adson test eric.    Plan of care Certification: 7/3/2024 to 8/16/2024.     Outpatient Physical Therapy 2 times weekly for 6 weeks to include the following interventions: Electrical Stimulation IFC, Manual Therapy, Moist Heat/ Ice, Patient Education, Therapeutic Exercise, and Ultrasound.        Harper Evans, PT  7/30/2024

## 2024-08-06 ENCOUNTER — CLINICAL SUPPORT (OUTPATIENT)
Dept: REHABILITATION | Facility: HOSPITAL | Age: 39
End: 2024-08-06
Payer: MEDICAID

## 2024-08-06 DIAGNOSIS — M54.12 CERVICAL RADICULOPATHY: Primary | ICD-10-CM

## 2024-08-06 PROCEDURE — 97140 MANUAL THERAPY 1/> REGIONS: CPT

## 2024-08-06 PROCEDURE — 97110 THERAPEUTIC EXERCISES: CPT

## 2024-09-23 ENCOUNTER — OFFICE VISIT (OUTPATIENT)
Dept: FAMILY MEDICINE | Facility: CLINIC | Age: 39
End: 2024-09-23
Payer: MEDICAID

## 2024-09-23 VITALS
DIASTOLIC BLOOD PRESSURE: 75 MMHG | HEIGHT: 67 IN | OXYGEN SATURATION: 97 % | SYSTOLIC BLOOD PRESSURE: 113 MMHG | WEIGHT: 276.19 LBS | BODY MASS INDEX: 43.35 KG/M2 | HEART RATE: 80 BPM | TEMPERATURE: 98 F

## 2024-09-23 DIAGNOSIS — G89.29 CHRONIC MIDLINE LOW BACK PAIN WITH LEFT-SIDED SCIATICA: Primary | ICD-10-CM

## 2024-09-23 DIAGNOSIS — M54.6 CHRONIC MIDLINE THORACIC BACK PAIN: ICD-10-CM

## 2024-09-23 DIAGNOSIS — G89.29 CHRONIC MIDLINE THORACIC BACK PAIN: ICD-10-CM

## 2024-09-23 DIAGNOSIS — M54.42 CHRONIC MIDLINE LOW BACK PAIN WITH LEFT-SIDED SCIATICA: Primary | ICD-10-CM

## 2024-09-23 PROCEDURE — 3074F SYST BP LT 130 MM HG: CPT | Mod: CPTII,,, | Performed by: FAMILY MEDICINE

## 2024-09-23 PROCEDURE — 1159F MED LIST DOCD IN RCRD: CPT | Mod: CPTII,,, | Performed by: FAMILY MEDICINE

## 2024-09-23 PROCEDURE — 99213 OFFICE O/P EST LOW 20 MIN: CPT | Mod: ,,, | Performed by: FAMILY MEDICINE

## 2024-09-23 PROCEDURE — 3078F DIAST BP <80 MM HG: CPT | Mod: CPTII,,, | Performed by: FAMILY MEDICINE

## 2024-09-23 PROCEDURE — 3008F BODY MASS INDEX DOCD: CPT | Mod: CPTII,,, | Performed by: FAMILY MEDICINE

## 2024-09-23 PROCEDURE — 4010F ACE/ARB THERAPY RXD/TAKEN: CPT | Mod: CPTII,,, | Performed by: FAMILY MEDICINE

## 2024-09-23 RX ORDER — CYCLOBENZAPRINE HCL 5 MG
5 TABLET ORAL 3 TIMES DAILY PRN
Qty: 30 TABLET | Refills: 0 | Status: SHIPPED | OUTPATIENT
Start: 2024-09-23 | End: 2024-10-03

## 2024-09-23 RX ORDER — AMITRIPTYLINE HYDROCHLORIDE 25 MG/1
25 TABLET, FILM COATED ORAL NIGHTLY PRN
Qty: 90 TABLET | Refills: 1 | Status: SHIPPED | OUTPATIENT
Start: 2024-09-23 | End: 2024-09-23

## 2024-09-23 RX ORDER — AMITRIPTYLINE HYDROCHLORIDE 10 MG/1
10 TABLET, FILM COATED ORAL NIGHTLY PRN
Qty: 90 TABLET | Refills: 0 | Status: SHIPPED | OUTPATIENT
Start: 2024-09-23

## 2024-09-23 NOTE — PROGRESS NOTES
Luisa Joel DO        PATIENT NAME: Mateus Ballesteros  : 1985  DATE: 24  MRN: [id number]      Reason for Visit / Chief Complaint: Back Pain (Patient states she has been having back pain for the last 4 weeks. )     History of Present Illness / Problem Focused Workflow     Mateus Ballesteros presents to the clinic with Back Pain (Patient states she has been having back pain for the last 4 weeks. )     Presents here for upper back and low back pain which started four weeks ago   Reports history chronic low back pain prior to this but states it flared up recently  Report the pain moves down the back of left leg at times   Denies any fever, chills, weakness in arms or legs  Reports she has tried multiple medications in the past  Reports she was previously referred to PT but feels that would not help her much         Review of Systems     Review of Systems   Constitutional:  Negative for chills and fever.   HENT:  Negative for sore throat.    Respiratory:  Negative for shortness of breath and wheezing.    Cardiovascular:  Negative for chest pain and palpitations.   Gastrointestinal:  Negative for abdominal pain, nausea and vomiting.        Medical / Social / Family History     Past Medical History:   Diagnosis Date    CHF (congestive heart failure)     Hypertension        No past surgical history on file.    Social History  Ms. Mateus Ballesteros  reports that she has quit smoking. Her smoking use included cigars. She has never used smokeless tobacco. She reports current alcohol use. She reports that she does not use drugs.    Family History  Ms. Mateus Ballesteros's family history is not on file.    Medications and Allergies     Medications  Outpatient Medications Marked as Taking for the 24 encounter (Office Visit) with Luisa Joel DO   Medication Sig Dispense Refill    hydroCHLOROthiazide (HYDRODIURIL) 25 MG tablet Take 1 tablet (25 mg total) by mouth once daily. 30 tablet 2    losartan  "(COZAAR) 50 MG tablet Take 1 tablet (50 mg total) by mouth once daily. TAKE ONE TABLET BY MOUTH ONCE A DAY- Do not take if you may become pregnant 30 tablet 2       Allergies  Review of patient's allergies indicates:   Allergen Reactions    Penicillins Nausea And Vomiting     Per pt made her "foam at the mouth"      Sulfa (sulfonamide antibiotics) Rash       Physical Examination     Vitals:    09/23/24 1507   BP: 113/75   Pulse: 80   Temp: 98.2 °F (36.8 °C)     Physical Exam  Constitutional:       General: She is not in acute distress.     Appearance: Normal appearance.   HENT:      Head: Normocephalic and atraumatic.   Cardiovascular:      Rate and Rhythm: Normal rate and regular rhythm.      Pulses: Normal pulses.      Heart sounds: Normal heart sounds. No murmur heard.  Pulmonary:      Effort: Pulmonary effort is normal.      Breath sounds: Normal breath sounds. No wheezing, rhonchi or rales.   Abdominal:      General: Bowel sounds are normal.      Palpations: Abdomen is soft.      Tenderness: There is no abdominal tenderness.   Musculoskeletal:      Thoracic back: No tenderness or bony tenderness.      Lumbar back: Spasms present. No tenderness or bony tenderness.   Neurological:      Mental Status: She is alert.          Assessment and Plan (including Health Maintenance)     Plan:   Chronic midline low back pain with left-sided sciatica  Chronic midline thoracic back pain  - Discussed role of PT in long-term management of back pain   - Will trial Amitriptyline and see how patient responds  - Sent short course supply of Flexeril for as needed use only      Follow up in about 1 month (around 10/23/2024) for Back pain follow up with PCP .    Signature:  Luisa Joel DO      Date of encounter: 9/23/24      "

## 2024-09-23 NOTE — PROGRESS NOTES
Health Maintenance Due   Topic Date Due    Lipid Panel  Never done    Pneumococcal Vaccines (Age 0-64) (1 of 2 - PCV) Never done    Hemoglobin A1c (Diabetic Prevention Screening)  Never done    TETANUS VACCINE  03/31/2024    Influenza Vaccine (1) Never done    COVID-19 Vaccine (1 - 2023-24 season) Never done     Patient [facility] [place]

## 2024-10-31 DIAGNOSIS — I10 ESSENTIAL HYPERTENSION: ICD-10-CM

## 2024-10-31 RX ORDER — LOSARTAN POTASSIUM 50 MG/1
50 TABLET ORAL
Qty: 30 TABLET | Refills: 2 | Status: SHIPPED | OUTPATIENT
Start: 2024-10-31

## 2024-10-31 RX ORDER — HYDROCHLOROTHIAZIDE 25 MG/1
25 TABLET ORAL
Qty: 30 TABLET | Refills: 2 | Status: SHIPPED | OUTPATIENT
Start: 2024-10-31

## 2024-12-11 NOTE — PROGRESS NOTES
Luisa Joel DO        PATIENT NAME: Mateus Ballesteros  : 1985  DATE: 24  MRN: [id number]      Reason for Visit / Chief Complaint: Recurrent Skin Infections (Pt reports having a boil under Right breast. Started 3 days ago. Also states that this is the 3rd time it has came back in the same spot )     History of Present Illness / Problem Focused Workflow     History of Present Illness    CHIEF COMPLAINT:  Patient presents today for abscess    HISTORY OF PRESENT ILLNESS:  Presents here for abscess under right breast which started three days ago. She has a history of abscess under her right breast with first occurrence 1-2 years ago.  Reports she has tried warm towel on the abscess but this seemed to cause enlargement of abscess. Reports the pain is causing difficulty sleeping at night.     Is interested in diabetes and lipid screening.    ALLERGIES:  She reports allergies to sulfa medications and penicillin.      ROS:  General: -fever, -chills, -fatigue, -weight gain, -weight loss  Eyes: -vision changes, -redness, -discharge  ENT: -ear pain, -nasal congestion, -sore throat  Cardiovascular: -chest pain, -palpitations, -lower extremity edema  Respiratory: -cough, -shortness of breath  Gastrointestinal: -abdominal pain, -nausea, -vomiting, -diarrhea, -constipation, -blood in stool  Genitourinary: -dysuria, -hematuria, -frequency  Musculoskeletal: -joint pain, -muscle pain  Skin: -rash, -lesion, + boil         Medical / Social / Family History     Past Medical History:   Diagnosis Date    CHF (congestive heart failure)     Hypertension        History reviewed. No pertinent surgical history.    Social History  Ms. Mateus Ballesteros  reports that she has quit smoking. Her smoking use included cigars. She has never used smokeless tobacco. She reports current alcohol use. She reports that she does not use drugs.    Family History  Ms. Mateus Ballesteros's family history includes Stroke in her  "mother.    Medications and Allergies     Medications  Outpatient Medications Marked as Taking for the 12/13/24 encounter (Office Visit) with Luisa Joel DO   Medication Sig Dispense Refill    hydroCHLOROthiazide (HYDRODIURIL) 25 MG tablet TAKE ONE TABLET BY MOUTH EVERY DAY 30 tablet 2    losartan (COZAAR) 50 MG tablet TAKE ONE TABLET BY MOUTH EVERY DAY 30 tablet 2     Current Facility-Administered Medications for the 12/13/24 encounter (Office Visit) with Luisa Joel DO   Medication Dose Route Frequency Provider Last Rate Last Admin    [DISCONTINUED] ondansetron injection 4 mg  4 mg Intramuscular 1 time in Clinic/HOD Luisa Joel DO           Allergies  Review of patient's allergies indicates:   Allergen Reactions    Penicillins Nausea And Vomiting     Per pt made her "foam at the mouth"      Sulfa (sulfonamide antibiotics) Rash       Physical Examination     Vitals:    12/13/24 0902   BP: 120/76   Pulse: 67   Resp: 20   Temp: 98.4 °F (36.9 °C)     Physical Exam  Constitutional:       General: She is not in acute distress.     Appearance: Normal appearance.   HENT:      Head: Normocephalic and atraumatic.   Cardiovascular:      Rate and Rhythm: Normal rate.   Pulmonary:      Effort: Pulmonary effort is normal.      Breath sounds: Normal breath sounds.   Skin:     General: Skin is warm.      Comments: Abscess present under right breast, tender, erythematous and surrounding induration.    Neurological:      General: No focal deficit present.      Mental Status: She is alert and oriented to person, place, and time.   Psychiatric:         Mood and Affect: Mood normal.         Behavior: Behavior normal.        Assessment and Plan (including Health Maintenance)     Plan:   Assessment & Plan      ABSCESS, RIGHT UPPER QUADRANT, UNDER RIGHT BREAST:  Explained the etiology of abscess formation as a bacterial skin infection, noting the patient's predisposition to recurrence.  Discussed " best course of treatment including I&D and antibiotics   Patient was initially hesitant about I&D procedure due to fear of pain. Discussed steps of procedure and use of lidocaine-epinephrine for local anesthesia and reviewed risks, benefits and alternatives for procedure.   After discussion, patient consented to having I&D done today.   I&D performed. See procedure note. Patient tolerated procedure well.   Start Doxycycline 100 mg twice daily. Will send Diflucan as needed as patient reports she tends to get yeast infections after antibiotics   Follow up in 1 week for reevaluation    SCREENING FOR DIABETES MELLITUS:   Hemoglobin A1 c ordered    SCREENING FOR LIPID DISORDER:   Lipid panel ordered         This note was generated with the assistance of ambient listening technology. Verbal consent was obtained by the patient and accompanying visitor(s) for the recording of patient appointment to facilitate this note. I attest to having reviewed and edited the generated note for accuracy, though some syntax or spelling errors may persist. Please contact the author of this note for any clarification.    LAD (lymphadenopathy), supraclavicular   LAD, supraclavicular is listed an encounter diagnosis. However this was NOT addressed today. Patient only presented for evaluation and treatment for cutaneous abscess. The diagnosis was added because a lab associated with that diagnosis was drawn today. This lab, CBC with Auto Diff ordered at a previous encounter with another provider. I am unable to remove this diagnosis from today's encounter.     Follow up in about 1 week (around 12/20/2024) for Abscess reevaluation - 40 min if repeat I&D is needed.     Signature:  Luisa Joel,       Date of encounter: 12/13/24

## 2024-12-13 ENCOUNTER — OFFICE VISIT (OUTPATIENT)
Dept: FAMILY MEDICINE | Facility: CLINIC | Age: 39
End: 2024-12-13
Payer: MEDICAID

## 2024-12-13 VITALS
DIASTOLIC BLOOD PRESSURE: 76 MMHG | HEART RATE: 67 BPM | WEIGHT: 271 LBS | OXYGEN SATURATION: 97 % | TEMPERATURE: 98 F | SYSTOLIC BLOOD PRESSURE: 120 MMHG | BODY MASS INDEX: 42.53 KG/M2 | RESPIRATION RATE: 20 BRPM | HEIGHT: 67 IN

## 2024-12-13 DIAGNOSIS — L02.91 ABSCESS: Primary | ICD-10-CM

## 2024-12-13 DIAGNOSIS — Z13.220 SCREENING FOR LIPID DISORDERS: ICD-10-CM

## 2024-12-13 DIAGNOSIS — Z13.1 SCREENING FOR DIABETES MELLITUS: ICD-10-CM

## 2024-12-13 DIAGNOSIS — R59.0 LAD (LYMPHADENOPATHY), SUPRACLAVICULAR: ICD-10-CM

## 2024-12-13 LAB
BASOPHILS # BLD AUTO: 0.03 K/UL (ref 0–0.2)
BASOPHILS NFR BLD AUTO: 0.5 % (ref 0–1)
CHOLEST SERPL-MCNC: 184 MG/DL
CHOLEST/HDLC SERPL: 5.1 {RATIO}
DIFFERENTIAL METHOD BLD: ABNORMAL
EOSINOPHIL # BLD AUTO: 0.05 K/UL (ref 0–0.5)
EOSINOPHIL NFR BLD AUTO: 0.8 % (ref 1–4)
ERYTHROCYTE [DISTWIDTH] IN BLOOD BY AUTOMATED COUNT: 13.1 % (ref 11.5–14.5)
EST. AVERAGE GLUCOSE BLD GHB EST-MCNC: 114 MG/DL
HBA1C MFR BLD HPLC: 5.6 %
HCT VFR BLD AUTO: 38.2 % (ref 38–47)
HDLC SERPL-MCNC: 36 MG/DL (ref 35–60)
HGB BLD-MCNC: 12.6 G/DL (ref 12–16)
IMM GRANULOCYTES # BLD AUTO: 0.01 K/UL (ref 0–0.04)
IMM GRANULOCYTES NFR BLD: 0.2 % (ref 0–0.4)
LDLC SERPL CALC-MCNC: 126 MG/DL
LDLC/HDLC SERPL: 3.5 {RATIO}
LYMPHOCYTES # BLD AUTO: 2.1 K/UL (ref 1–4.8)
LYMPHOCYTES NFR BLD AUTO: 31.5 % (ref 27–41)
MCH RBC QN AUTO: 29.9 PG (ref 27–31)
MCHC RBC AUTO-ENTMCNC: 33 G/DL (ref 32–36)
MCV RBC AUTO: 90.7 FL (ref 80–96)
MONOCYTES # BLD AUTO: 0.48 K/UL (ref 0–0.8)
MONOCYTES NFR BLD AUTO: 7.2 % (ref 2–6)
MPC BLD CALC-MCNC: 12.2 FL (ref 9.4–12.4)
NEUTROPHILS # BLD AUTO: 3.99 K/UL (ref 1.8–7.7)
NEUTROPHILS NFR BLD AUTO: 59.8 % (ref 53–65)
NONHDLC SERPL-MCNC: 148 MG/DL
NRBC # BLD AUTO: 0 X10E3/UL
NRBC, AUTO (.00): 0 %
PLATELET # BLD AUTO: 295 K/UL (ref 150–400)
RBC # BLD AUTO: 4.21 M/UL (ref 4.2–5.4)
TRIGL SERPL-MCNC: 112 MG/DL (ref 37–140)
VLDLC SERPL-MCNC: 22 MG/DL
WBC # BLD AUTO: 6.66 K/UL (ref 4.5–11)

## 2024-12-13 PROCEDURE — 85025 COMPLETE CBC W/AUTO DIFF WBC: CPT | Mod: ,,, | Performed by: CLINICAL MEDICAL LABORATORY

## 2024-12-13 PROCEDURE — 83036 HEMOGLOBIN GLYCOSYLATED A1C: CPT | Mod: ,,, | Performed by: CLINICAL MEDICAL LABORATORY

## 2024-12-13 PROCEDURE — 80061 LIPID PANEL: CPT | Mod: ,,, | Performed by: CLINICAL MEDICAL LABORATORY

## 2024-12-13 RX ORDER — ONDANSETRON 2 MG/ML
4 INJECTION INTRAMUSCULAR; INTRAVENOUS
Status: DISCONTINUED | OUTPATIENT
Start: 2024-12-13 | End: 2024-12-13

## 2024-12-13 RX ORDER — FLUCONAZOLE 150 MG/1
150 TABLET ORAL ONCE AS NEEDED
Qty: 1 TABLET | Refills: 0 | Status: SHIPPED | OUTPATIENT
Start: 2024-12-13

## 2024-12-13 RX ORDER — HYDROCODONE BITARTRATE AND ACETAMINOPHEN 5; 325 MG/1; MG/1
1 TABLET ORAL EVERY 8 HOURS PRN
Qty: 12 TABLET | Refills: 0 | Status: SHIPPED | OUTPATIENT
Start: 2024-12-13 | End: 2024-12-17

## 2024-12-13 RX ORDER — DOXYCYCLINE 100 MG/1
100 CAPSULE ORAL 2 TIMES DAILY
Qty: 14 CAPSULE | Refills: 0 | Status: SHIPPED | OUTPATIENT
Start: 2024-12-13 | End: 2024-12-20

## 2024-12-13 NOTE — PROGRESS NOTES
Health Maintenance Due   Topic Date Due    Lipid Panel  Never done    Pneumococcal Vaccines (Age 0-64) (1 of 2 - PCV) Never done    Hemoglobin A1c (Diabetic Prevention Screening)  Never done    TETANUS VACCINE  03/31/2024    Influenza Vaccine (1) Never done    COVID-19 Vaccine (1 - 2024-25 season) Never done     Pt wants A1c and Lipid panel

## 2024-12-13 NOTE — PROGRESS NOTES
Procedure Note:     Abscess, under right breast, upper abdominal region. Patient consented to the procedure. The area was cleaned with alcohol. The area was anesthestized with  1% lidocaine without epinephrine. I used an 11 blade to cut a very small incision. Expressed foul-smelling greenish yellow pus. Material was not sent for culture. Patient tolerated the procedure well with approximately 1mL of blood loss.     Dr. Wisam DO

## 2024-12-19 ENCOUNTER — OFFICE VISIT (OUTPATIENT)
Dept: FAMILY MEDICINE | Facility: CLINIC | Age: 39
End: 2024-12-19
Payer: MEDICAID

## 2024-12-19 VITALS
HEIGHT: 67 IN | OXYGEN SATURATION: 96 % | HEART RATE: 65 BPM | BODY MASS INDEX: 42.72 KG/M2 | DIASTOLIC BLOOD PRESSURE: 75 MMHG | WEIGHT: 272.19 LBS | TEMPERATURE: 99 F | SYSTOLIC BLOOD PRESSURE: 112 MMHG

## 2024-12-19 DIAGNOSIS — L08.9 RECURRENT INFECTION OF SKIN: Primary | ICD-10-CM

## 2024-12-19 PROCEDURE — 3008F BODY MASS INDEX DOCD: CPT | Mod: CPTII,,, | Performed by: FAMILY MEDICINE

## 2024-12-19 PROCEDURE — 3074F SYST BP LT 130 MM HG: CPT | Mod: CPTII,,, | Performed by: FAMILY MEDICINE

## 2024-12-19 PROCEDURE — 4010F ACE/ARB THERAPY RXD/TAKEN: CPT | Mod: CPTII,,, | Performed by: FAMILY MEDICINE

## 2024-12-19 PROCEDURE — 3078F DIAST BP <80 MM HG: CPT | Mod: CPTII,,, | Performed by: FAMILY MEDICINE

## 2024-12-19 PROCEDURE — 99213 OFFICE O/P EST LOW 20 MIN: CPT | Mod: ,,, | Performed by: FAMILY MEDICINE

## 2024-12-19 PROCEDURE — 3044F HG A1C LEVEL LT 7.0%: CPT | Mod: CPTII,,, | Performed by: FAMILY MEDICINE

## 2024-12-19 PROCEDURE — 1159F MED LIST DOCD IN RCRD: CPT | Mod: CPTII,,, | Performed by: FAMILY MEDICINE

## 2024-12-19 RX ORDER — DOXYCYCLINE 100 MG/1
100 CAPSULE ORAL 2 TIMES DAILY
Qty: 14 CAPSULE | Refills: 0 | Status: SHIPPED | OUTPATIENT
Start: 2024-12-19 | End: 2024-12-26

## 2024-12-19 NOTE — PROGRESS NOTES
Luisa Joel DO        PATIENT NAME: Mateus Ballesteros  : 1985  DATE: 24  MRN: [id number]      Reason for Visit / Chief Complaint: Abscess (Re-evaluate abscess under right breast)     History of Present Illness / Problem Focused Workflow     Presents here for follow up after I&D of abscess under right breast done on    Patient reports the area is no longer painful but is still raised and some areas which are firm   Is concerned for recurrence of infection as she has had this happened before     Per chart review, patient was previous referred to Hepatology at [facility] for positive Hep B   Reports she is following up with them every 6 months     Was noted have supraclavicular lymphadenopathy which resolved with antibiotics     Review of Systems   Constitutional:  Negative for chills and fever.   Respiratory:  Negative for cough, shortness of breath and wheezing.    Cardiovascular:  Negative for chest pain.   Gastrointestinal:  Negative for abdominal pain, nausea and vomiting.   Genitourinary:  Negative for dysuria.   Skin:  Negative for rash.   Psychiatric/Behavioral:  Negative for suicidal ideas.      Medical / Social / Family History     Past Medical History:   Diagnosis Date    CHF (congestive heart failure)     Hypertension        No past surgical history on file.    Social History  Ms. Mateus Ballesteros  reports that she has quit smoking. Her smoking use included cigars. She has never used smokeless tobacco. She reports current alcohol use. She reports that she does not use drugs.    Family History  Ms. Mateus Ballesteros's family history includes Stroke in her mother.    Medications and Allergies     Medications  Outpatient Medications Marked as Taking for the 24 encounter (Office Visit) with Luisa Joel DO   Medication Sig Dispense Refill    amitriptyline (ELAVIL) 10 MG tablet Take 1 tablet (10 mg total) by mouth nightly as needed for Pain. 90 tablet 0    doxycycline  "(VIBRAMYCIN) 100 MG Cap Take 1 capsule (100 mg total) by mouth 2 (two) times daily. for 7 days 14 capsule 0    fluconazole (DIFLUCAN) 150 MG Tab Take 1 tablet (150 mg total) by mouth 1 (one) time if needed (Yeast infection). 1 tablet 0    hydroCHLOROthiazide (HYDRODIURIL) 25 MG tablet TAKE ONE TABLET BY MOUTH EVERY DAY 30 tablet 2    losartan (COZAAR) 50 MG tablet TAKE ONE TABLET BY MOUTH EVERY DAY 30 tablet 2       Allergies  Review of patient's allergies indicates:   Allergen Reactions    Penicillins Nausea And Vomiting     Per pt made her "foam at the mouth"      Sulfa (sulfonamide antibiotics) Rash       Physical Examination     Vitals:    12/19/24 1548   BP: 112/75   Pulse: 65   Temp: 98.8 °F (37.1 °C)     Physical Exam  Constitutional:       Appearance: Normal appearance.   Cardiovascular:      Rate and Rhythm: Normal rate.   Pulmonary:      Effort: Pulmonary effort is normal.   Skin:     General: Skin is warm.      Comments: Area under right breast, raised skin at the site of previous abscess, now non-tender, without any drainage. Most of the raised area is soft to touch without induration.    Neurological:      General: No focal deficit present.      Mental Status: She is alert and oriented to person, place, and time.         Assessment and Plan (including Health Maintenance)     Plan:   1. Recurrent abscess     - No signs of acute infection on exam. However, raised skin with dark discoloration present   - DDx including residual inflammation, underlying cyst, keloid or hypertrophic scar  - After discussion with patient, will extend course of antibitoics to 14 days total and refer to Dermatology for further evaluation.   - Patient agreeable with plan   - Follow up in 1-2 weeks for evaluation       Signature:  Luisa Joel DO      Date of encounter: 12/19/24      "

## 2025-02-05 DIAGNOSIS — I10 ESSENTIAL HYPERTENSION: ICD-10-CM

## 2025-02-05 RX ORDER — HYDROCHLOROTHIAZIDE 25 MG/1
25 TABLET ORAL DAILY
Qty: 90 TABLET | Refills: 0 | Status: SHIPPED | OUTPATIENT
Start: 2025-02-05 | End: 2025-05-06

## 2025-02-05 RX ORDER — LOSARTAN POTASSIUM 50 MG/1
50 TABLET ORAL DAILY
Qty: 90 TABLET | Refills: 0 | Status: SHIPPED | OUTPATIENT
Start: 2025-02-05 | End: 2025-05-06

## 2025-04-16 ENCOUNTER — PATIENT OUTREACH (OUTPATIENT)
Facility: HOSPITAL | Age: 40
End: 2025-04-16
Payer: MEDICAID

## 2025-04-16 ENCOUNTER — TELEPHONE (OUTPATIENT)
Dept: FAMILY MEDICINE | Facility: CLINIC | Age: 40
End: 2025-04-16
Payer: MEDICAID

## 2025-04-16 NOTE — TELEPHONE ENCOUNTER
[facility]; will [facility]  Mammogram is scheduled at [place] for May 15 or 16.

## 2025-04-16 NOTE — PROGRESS NOTES
Population Health Chart Review & Patient Outreach Details    Updates Requested / Reviewed:  [x]  Care Team Updated    Health Maintenance Topics Addressed and Outreach Outcomes / Actions Taken:  Cervical Cancer Screening [x] [facility]

## 2025-04-16 NOTE — LETTER
AUTHORIZATION FOR RELEASE OF   CONFIDENTIAL INFORMATION    Dear Health Department,    We are seeing Keny Sanchez, date of birth 1985, in the clinic at [facility]. Rigoberto Hannah MD is the patient's PCP. Keny Sanchez has an outstanding lab/procedure at the time we reviewed her chart. In order to help keep her health information updated, she has authorized us to request the following medical record(s):        (  )  MAMMOGRAM                                      (  )  COLONOSCOPY      ( X )  PAP SMEAR                                          (  )  OUTSIDE LAB RESULTS     (  )  DEXA SCAN                                          (  )  EYE EXAM            (  )  FOOT EXAM                                          (  )  ENTIRE RECORD     (  )  OUTSIDE IMMUNIZATIONS                 (  )  _______________         Please fax records to Hiral Millard LPN Care Coordinator at [phone].     If you have any questions, please call [phone].          Patient Name: Keny Sanchez  : 1985  Patient Phone #: [phone]            Keny Sanchez  MRN: [id number]  : 1985  Age: 38 y.o.  Sex: female         Patient/Legal Guardian Signature  This signature was collected at 2024    KENY SANCHEZ     Self  _______________________________   Printed Name/Relationship to Patient      Consent for Examination and Treatment: I hereby authorize the providers and employees of [facility] ([facility]) to provide medical treatment/services which includes, but is not limited to, performing and administering tests and diagnostic procedures that are deemed necessary, including, but not limited to, imaging examinations, blood tests and other laboratory procedures as may be required by the hospital, clinic, or may be ordered by my physician(s) or persons working under the general and/or special instructions of my physician(s).      I understand and agree that this consent covers all authorized  persons, including but not limited to physicians, residents, nurse practitioners, physicians' assistants, specialists, consultants, student nurses, and independently contracted physicians, who are called upon by the physician in charge, to carry out the diagnostic procedures and medical or surgical treatment.     I hereby authorize [facility] to retain or dispose of any specimens or tissue, should there be such remaining from any test or procedure.     I hereby authorize and give consent for [facility] providers and employees to take photographs, images or videotapes of such diagnostic, surgical or treatment procedures of Patient as may be required by [facility] or as may be ordered by a physician. I further acknowledge and agree that [facility] may use cameras or other devices for patient monitoring.     I am aware that the practice of medicine is not an exact science, and I acknowledge that no guarantees have been made to me as to the outcome of any tests, procedures or treatment.     Authorization for Release of Information: I understand that my insurance company and/or their agents may need information necessary to make determinations about payment/reimbursement. I hereby provide authorization to release to all insurance companies, their successors, assignees, other parties with whom they may have contracted, or others acting on their behalf, that are involved with payment for any hospital and/or clinic charges incurred by the patient, any information that they request and deem necessary for payment/reimbursement, and/or quality review.  I further authorize the release of my health information to physicians or other health care practitioners on staff who are involved in my health care now and in the future, and to other health care providers, entities, or institutions for the purpose of my continued care and treatment, including referrals.     REGISTRATION AUTHORIZATION  Form No. 59006 (Rev. 7/13/2022)       Medicare  Patient's Certification and Authorization to Release Information and Payment Request:  I certify that the information given by me in applying for payment under Title XVIII of the Social Security Act is correct. I authorize any nathan of medical or other information about me to release to the Social SecurityAdministration, or its intermediaries or carriers, any information needed for this or a related Medicare claim. I request that payment of authorized benefits be made on my behalf.     Assignment of Insurance Benefits:   I hereby authorize any and all insurance companies, health plans, defined   benefit plans, health insurers or any entity that is or may be responsible for payment of my medical expenses to pay all hospital and medical benefits now due, and to become due and payable to me under any hospital benefits, sick benefits, injury benefits or any other benefit for services rendered to me, including Major Medical Benefits, direct to [facility] and all independently contracted physicians. I assign any and all rights that I may have against any and all insurance companies, health plans, defined benefit plans, health insurers or any entity that is or may be responsible for payment of my medical expenses, including, but not limited to any right to appeal a denial of a claim, any right to bring any action, lawsuit, administrative proceeding, or other cause of action on my behalf. I specifically assign my right to pursue litigation against any and all insurance companies, health plans, defined benefit plans, health insurers or any entity that is or may be responsible for payment of my medical expenses based upon a refusal to pay charges.            E. Valuables: It is understood and agreed that [facility] is not liable for the damage to or loss of any money, jewelry,   documents, dentures, eye glasses, hearing aids, prosthetics, or other property of value.     F. Computer Equipment: I understand and agree that should I  choose to use computer equipment owned by [id number] or if I choose to access the Internet via [facility]s network, I do so at my own risk. [facility] is not responsible for any damage to my computer equipment or to any damages of any type that might arise from my loss of equipment or data.     G. Acceptance of Financial Responsibility:  I agree that in consideration of the services and   supplies that have been   or will be furnished to the patient, I am hereby obligated to pay all charges made for or on the account of the patient according to the standard rates (in effect at the time the services and supplies are delivered) established by [facility], including its Patient Financial Assistance Policy to the extent it is applicable. I understand that I am responsible for all charges, or portions thereof, not covered by insurance or other sources. Patient refunds will be distributed only after balances at all [facility] facilities are paid.     H. Communication Authorization:  I hereby authorize [facility] and its representatives, along with any billing service   or  who may work on their behalf, to contact me on   my cell phone and/or home phone using pre- recorded messages, artificial voice messages, automatic telephone dialing devices or other computer assisted technology, or by electronic      mail, text messaging, or by any other form of electronic communication. This includes, but is not limited to, appointment reminders, yearly physical exam reminders, preventive care reminders, patient campaigns, welcome calls, and calls about account balances on my account or any account on which I am listed as a guarantor. I understand I have the right to opt out of these communications at any time.      Relationship  Between  Facility and  Provider:      I understand that some, but not all, providers furnishing services to the patient are not employees or agents of [facility]. The patient is under the care and supervision of  his/her attending physician, and it is the responsibility of the facility and its nursing staff to carry out the instructions of such physicians. It is the responsibility of the patient's physician/designee to obtain the patient's informed consent, when required, for medical or surgical treatment, special diagnostic or therapeutic procedures, or hospital services rendered for the patient under the special instructions of the physician/designee.     REGISTRATION AUTHORIZATION  Form No. 60841 (Rev. 7/13/2022)      Notice of Privacy Practices: I acknowledge I have received a copy of [facility]'s Notice of Privacy Practices.     Facility  Directory: I have discussed with the organization my desire to be either included or excluded  in the facility directory in the event of my being an inpatient at an [facility] facility. I understand that if my choice is to opt-out of being identified in the facility directory that the facility will not provide any information about me such as my condition (e.g. fair, stable, etc.) or my location in the facility (e.g., room number, department).     Immunizations: [facility] shares immunization information with state sponsored health departments to help you and your doctor keep track of your immunization records. By signing, you consent to have this information shared with the health department in your state:                                [place] - LINKS ([place] Immunization Network for Kids Statewide)                                [place] - MIIX ([place] Immunization Information eXchange)                                [place] - ImmPRINT (Immunization Patient Registry with Integrated Technology)     TERM: This authorization is valid for this and subsequent care/treatment I receive at [facility] and will remain valid unless/until revoked in writing by me.     [facility]: As used in this document, [facility] means all [facility] owned and managed facilities, including, but  not limited to, all health centers, surgery centers, clinics, urgent care centers, and hospitals.         [facility] complies with applicable Federal civil rights laws and does not discriminate on the basis of race, color, national origin, age, disability, or sex.  ATENCIÓN: si habla ashaañol, tiene a dias disposición servicios gratuitos de asistencia lingüística. Debora al [phone].  CHÚ Ý: N?u b?n nói Ti?ng Vi?t, có các d?ch v? h? tr? ngôn ng? mi?n phí dành cho b?n. G?i s? [phone].        REGISTRATION AUTHORIZATION  Form No. 78887 (Rev. 7/13/2022)   Patient      On Close send to:

## 2025-05-15 LAB — BCS RECOMMENDATION EXT: NORMAL

## 2025-05-19 ENCOUNTER — PATIENT OUTREACH (OUTPATIENT)
Facility: HOSPITAL | Age: 40
End: 2025-05-19
Payer: MEDICAID

## 2025-05-19 NOTE — LETTER
AUTHORIZATION FOR RELEASE OF   CONFIDENTIAL INFORMATION    Dear [facility],    We are seeing Keny Sanchez, date of birth 1985, in the clinic at [facility]. Rigoberto Hannah MD is the patient's PCP. Keny Sanchez has an outstanding lab/procedure at the time we reviewed her chart. In order to help keep her health information updated, she has authorized us to request the following medical record(s):        ( X )  MAMMOGRAM                                      (  )  COLONOSCOPY      (  )  PAP SMEAR                                          (  )  OUTSIDE LAB RESULTS     (  )  DEXA SCAN                                          (  )  EYE EXAM            (  )  FOOT EXAM                                          (  )  ENTIRE RECORD     (  )  OUTSIDE IMMUNIZATIONS                 (  )  _______________         Please fax records to Hiral Millard LPN Care Coordinator at [phone].       If you have any questions, please call [phone]          Patient Name: Keny Sanchez  : 1985  Patient Phone #: [phone]            Keny Sanchez  MRN: [id number]  : 1985  Age: 38 y.o.  Sex: female         Patient/Legal Guardian Signature  This signature was collected at 2024    KENY SANCHEZ     Self  _______________________________   Printed Name/Relationship to Patient      Consent for Examination and Treatment: I hereby authorize the providers and employees of [facility] ([facility]) to provide medical treatment/services which includes, but is not limited to, performing and administering tests and diagnostic procedures that are deemed necessary, including, but not limited to, imaging examinations, blood tests and other laboratory procedures as may be required by the hospital, clinic, or may be ordered by my physician(s) or persons working under the general and/or special instructions of my physician(s).      I understand and agree that this consent covers all authorized persons,  including but not limited to physicians, residents, nurse practitioners, physicians' assistants, specialists, consultants, student nurses, and independently contracted physicians, who are called upon by the physician in charge, to carry out the diagnostic procedures and medical or surgical treatment.     I hereby authorize [facility] to retain or dispose of any specimens or tissue, should there be such remaining from any test or procedure.     I hereby authorize and give consent for [facility] providers and employees to take photographs, images or videotapes of such diagnostic, surgical or treatment procedures of Patient as may be required by [facility] or as may be ordered by a physician. I further acknowledge and agree that [facility] may use cameras or other devices for patient monitoring.     I am aware that the practice of medicine is not an exact science, and I acknowledge that no guarantees have been made to me as to the outcome of any tests, procedures or treatment.     Authorization for Release of Information: I understand that my insurance company and/or their agents may need information necessary to make determinations about payment/reimbursement. I hereby provide authorization to release to all insurance companies, their successors, assignees, other parties with whom they may have contracted, or others acting on their behalf, that are involved with payment for any hospital and/or clinic charges incurred by the patient, any information that they request and deem necessary for payment/reimbursement, and/or quality review.  I further authorize the release of my health information to physicians or other health care practitioners on staff who are involved in my health care now and in the future, and to other health care providers, entities, or institutions for the purpose of my continued care and treatment, including referrals.     REGISTRATION AUTHORIZATION  Form No. 86098 (Rev. 7/13/2022)       Medicare Patient's  Certification and Authorization to Release Information and Payment Request:  I certify that the information given by me in applying for payment under Title XVIII of the Social Security Act is correct. I authorize any nathan of medical or other information about me to release to the Social SecurityAdministration, or its intermediaries or carriers, any information needed for this or a related Medicare claim. I request that payment of authorized benefits be made on my behalf.     Assignment of Insurance Benefits:   I hereby authorize any and all insurance companies, health plans, defined   benefit plans, health insurers or any entity that is or may be responsible for payment of my medical expenses to pay all hospital and medical benefits now due, and to become due and payable to me under any hospital benefits, sick benefits, injury benefits or any other benefit for services rendered to me, including Major Medical Benefits, direct to [facility] and all independently contracted physicians. I assign any and all rights that I may have against any and all insurance companies, health plans, defined benefit plans, health insurers or any entity that is or may be responsible for payment of my medical expenses, including, but not limited to any right to appeal a denial of a claim, any right to bring any action, lawsuit, administrative proceeding, or other cause of action on my behalf. I specifically assign my right to pursue litigation against any and all insurance companies, health plans, defined benefit plans, health insurers or any entity that is or may be responsible for payment of my medical expenses based upon a refusal to pay charges.            E. Valuables: It is understood and agreed that [facility] is not liable for the damage to or loss of any money, jewelry,   documents, dentures, eye glasses, hearing aids, prosthetics, or other property of value.     F. Computer Equipment: I understand and agree that should I choose to  use computer equipment owned by [facility] or if I choose to access the Internet via [facility]s network, I do so at my own risk. [facility] is not responsible for any damage to my computer equipment or to any damages of any type that might arise from my loss of equipment or data.     G. Acceptance of Financial Responsibility:  I agree that in consideration of the services and   supplies that have been   or will be furnished to the patient, I am hereby obligated to pay all charges made for or on the account of the patient according to the standard rates (in effect at the time the services and supplies are delivered) established by [facility], including its Patient Financial Assistance Policy to the extent it is applicable. I understand that I am responsible for all charges, or portions thereof, not covered by insurance or other sources. Patient refunds will be distributed only after balances at all [facility] facilities are paid.     H. Communication Authorization:  I hereby authorize [facility] and its representatives, along with any billing service   or  who may work on their behalf, to contact me on   my cell phone and/or home phone using pre- recorded messages, artificial voice messages, automatic telephone dialing devices or other computer assisted technology, or by electronic      mail, text messaging, or by any other form of electronic communication. This includes, but is not limited to, appointment reminders, yearly physical exam reminders, preventive care reminders, patient campaigns, welcome calls, and calls about account balances on my account or any account on which I am listed as a guarantor. I understand I have the right to opt out of these communications at any time.      Relationship  Between  Facility and  Provider:      I understand that some, but not all, providers furnishing services to the patient are not employees or agents of [facility]. The patient is under the care and supervision of his/her  attending physician, and it is the responsibility of the facility and its nursing staff to carry out the instructions of such physicians. It is the responsibility of the patient's physician/designee to obtain the patient's informed consent, when required, for medical or surgical treatment, special diagnostic or therapeutic procedures, or hospital services rendered for the patient under the special instructions of the physician/designee.     REGISTRATION AUTHORIZATION  Form No. 43888 (Rev. 7/13/2022)      Notice of Privacy Practices: I acknowledge I have received a copy of [facility]'s Notice of Privacy Practices.     Facility  Directory: I have discussed with the organization my desire to be either included or excluded  in the facility directory in the event of my being an inpatient at an [facility] facility. I understand that if my choice is to opt-out of being identified in the facility directory that the facility will not provide any information about me such as my condition (e.g. fair, stable, etc.) or my location in the facility (e.g., room number, department).     Immunizations: [facility] shares immunization information with state sponsored health departments to help you and your doctor keep track of your immunization records. By signing, you consent to have this information shared with the health department in your state:                                [place] - LINKS ([place] Immunization Network for Kids Statewide)                                [place] - MIIX ([place] Immunization Information eXchange)                                [place] - ImmPRINT (Immunization Patient Registry with Integrated Technology)     TERM: This authorization is valid for this and subsequent care/treatment I receive at [facility] and will remain valid unless/until revoked in writing by me.     [facility]: As used in this document, [facility] means all [facility] owned and managed facilities, including, but not  limited to, all health centers, surgery centers, clinics, urgent care centers, and hospitals.         [facility] complies with applicable Federal civil rights laws and does not discriminate on the basis of race, color, national origin, age, disability, or sex.  ATENCIÓN: si habla español, tiene a dias disposición servicios gratuitos de asistencia lingüística. Debora al [phone].  MARTIN Ý: N?u b?n nói Ti?ng Vi?t, có các d?ch v? h? tr? ngôn ng? mi?n phí dành cho b?n. G?i s? [phone].

## 2025-05-19 NOTE — PROGRESS NOTES
Population Health Chart Review & Patient Outreach Details        Health Maintenance Topics Addressed and Outreach Outcomes / Actions Taken:  Breast Cancer Screening [x] Michael sent to [facility] for mammogram 2nd attempt

## 2025-05-23 ENCOUNTER — PATIENT OUTREACH (OUTPATIENT)
Facility: HOSPITAL | Age: 40
End: 2025-05-23
Payer: MEDICAID

## 2025-05-23 NOTE — LETTER
AUTHORIZATION FOR RELEASE OF   CONFIDENTIAL INFORMATION    Dear Kelle Galindo,    We are seeing Keny Sanchez, date of birth 1985, in the clinic at [facility]. Rigoberto Hannah MD is the patient's PCP. Keny Sanchez has an outstanding lab/procedure at the time we reviewed her chart. In order to help keep her health information updated, she has authorized us to request the following medical record(s):        ( X )  MAMMOGRAM                                      (  )  COLONOSCOPY      (  )  PAP SMEAR                                          (  )  OUTSIDE LAB RESULTS     (  )  DEXA SCAN                                          (  )  EYE EXAM            (  )  FOOT EXAM                                          (  )  ENTIRE RECORD     (  )  OUTSIDE IMMUNIZATIONS                 (  )  _______________         Please fax records to Hiral Millard LPN Care Coordinator at [phone].       If you have any questions, please call [phone].          Patient Name: Keny Sanchez  : 1985  Patient Phone #: [phone]            Keny Sanchez  MRN: [id number]  : 1985  Age: 38 y.o.  Sex: female         Patient/Legal Guardian Signature  This signature was collected at 2024    KENY SANCHEZ     Self  _______________________________   Printed Name/Relationship to Patient      Consent for Examination and Treatment: I hereby authorize the providers and employees of [facility] ([facility]) to provide medical treatment/services which includes, but is not limited to, performing and administering tests and diagnostic procedures that are deemed necessary, including, but not limited to, imaging examinations, blood tests and other laboratory procedures as may be required by the hospital, clinic, or may be ordered by my physician(s) or persons working under the general and/or special instructions of my physician(s).      I understand and agree that this consent covers all authorized  persons, including but not limited to physicians, residents, nurse practitioners, physicians' assistants, specialists, consultants, student nurses, and independently contracted physicians, who are called upon by the physician in charge, to carry out the diagnostic procedures and medical or surgical treatment.     I hereby authorize [facility] to retain or dispose of any specimens or tissue, should there be such remaining from any test or procedure.     I hereby authorize and give consent for [facility] providers and employees to take photographs, images or videotapes of such diagnostic, surgical or treatment procedures of Patient as may be required by [facility] or as may be ordered by a physician. I further acknowledge and agree that [facility] may use cameras or other devices for patient monitoring.     I am aware that the practice of medicine is not an exact science, and I acknowledge that no guarantees have been made to me as to the outcome of any tests, procedures or treatment.     Authorization for Release of Information: I understand that my insurance company and/or their agents may need information necessary to make determinations about payment/reimbursement. I hereby provide authorization to release to all insurance companies, their successors, assignees, other parties with whom they may have contracted, or others acting on their behalf, that are involved with payment for any hospital and/or clinic charges incurred by the patient, any information that they request and deem necessary for payment/reimbursement, and/or quality review.  I further authorize the release of my health information to physicians or other health care practitioners on staff who are involved in my health care now and in the future, and to other health care providers, entities, or institutions for the purpose of my continued care and treatment, including referrals.     REGISTRATION AUTHORIZATION  Form No. 86122 (Rev. 7/13/2022)       Medicare  Patient's Certification and Authorization to Release Information and Payment Request:  I certify that the information given by me in applying for payment under Title XVIII of the Social Security Act is correct. I authorize any nathan of medical or other information about me to release to the Social SecurityAdministration, or its intermediaries or carriers, any information needed for this or a related Medicare claim. I request that payment of authorized benefits be made on my behalf.     Assignment of Insurance Benefits:   I hereby authorize any and all insurance companies, health plans, defined   benefit plans, health insurers or any entity that is or may be responsible for payment of my medical expenses to pay all hospital and medical benefits now due, and to become due and payable to me under any hospital benefits, sick benefits, injury benefits or any other benefit for services rendered to me, including Major Medical Benefits, direct to [facility] and all independently contracted physicians. I assign any and all rights that I may have against any and all insurance companies, health plans, defined benefit plans, health insurers or any entity that is or may be responsible for payment of my medical expenses, including, but not limited to any right to appeal a denial of a claim, any right to bring any action, lawsuit, administrative proceeding, or other cause of action on my behalf. I specifically assign my right to pursue litigation against any and all insurance companies, health plans, defined benefit plans, health insurers or any entity that is or may be responsible for payment of my medical expenses based upon a refusal to pay charges.            E. Valuables: It is understood and agreed that [facility] is not liable for the damage to or loss of any money, jewelry,   documents, dentures, eye glasses, hearing aids, prosthetics, or other property of value.     F. Computer Equipment: I understand and agree that should I  choose to use computer equipment owned by [facility] or if I choose to access the Internet via [facility]s network, I do so at my own risk. [facility] is not responsible for any damage to my computer equipment or to any damages of any type that might arise from my loss of equipment or data.     G. Acceptance of Financial Responsibility:  I agree that in consideration of the services and   supplies that have been   or will be furnished to the patient, I am hereby obligated to pay all charges made for or on the account of the patient according to the standard rates (in effect at the time the services and supplies are delivered) established by [facility], including its Patient Financial Assistance Policy to the extent it is applicable. I understand that I am responsible for all charges, or portions thereof, not covered by insurance or other sources. Patient refunds will be distributed only after balances at all [facility] facilities are paid.     H. Communication Authorization:  I hereby authorize [facility] and its representatives, along with any billing service   or  who may work on their behalf, to contact me on   my cell phone and/or home phone using pre- recorded messages, artificial voice messages, automatic telephone dialing devices or other computer assisted technology, or by electronic      mail, text messaging, or by any other form of electronic communication. This includes, but is not limited to, appointment reminders, yearly physical exam reminders, preventive care reminders, patient campaigns, welcome calls, and calls about account balances on my account or any account on which I am listed as a guarantor. I understand I have the right to opt out of these communications at any time.      Relationship  Between  Facility and  Provider:      I understand that some, but not all, providers furnishing services to the patient are not employees or agents of [facility]. The patient is under the care and supervision of  his/her attending physician, and it is the responsibility of the facility and its nursing staff to carry out the instructions of such physicians. It is the responsibility of the patient's physician/designee to obtain the patient's informed consent, when required, for medical or surgical treatment, special diagnostic or therapeutic procedures, or hospital services rendered for the patient under the special instructions of the physician/designee.     REGISTRATION AUTHORIZATION  Form No. 56216 (Rev. 7/13/2022)      Notice of Privacy Practices: I acknowledge I have received a copy of [facility]'s Notice of Privacy Practices.     Facility  Directory: I have discussed with the organization my desire to be either included or excluded  in the facility directory in the event of my being an inpatient at an [facility] facility. I understand that if my choice is to opt-out of being identified in the facility directory that the facility will not provide any information about me such as my condition (e.g. fair, stable, etc.) or my location in the facility (e.g., room number, department).     Immunizations: [facility] shares immunization information with state sponsored health departments to help you and your doctor keep track of your immunization records. By signing, you consent to have this information shared with the health department in your state:                                [place] - LINKS ([place] Immunization Network for Kids Statewide)                                [place] - MIIX ([place] Immunization Information eXchange)                                [place] - ImmPRINT (Immunization Patient Registry with Integrated Technology)     TERM: This authorization is valid for this and subsequent care/treatment I receive at [facility] and will remain valid unless/until revoked in writing by me.     [facility]: As used in this document, [facility] means all [facility] owned and managed facilities, including, but  not limited to, all health centers, surgery centers, clinics, urgent care centers, and hospitals.         [facility] complies with applicable Federal civil rights laws and does not discriminate on the basis of race, color, national origin, age, disability, or sex.  ATENCIÓN: si habla ashaañol, tiene a dias disposición servicios gratuitos de asistencia lingüística. Debora franklin [phone].  CHÚ Ý: N?u b?n nói Ti?ng Vi?t, có các d?ch v? h? tr? ngôn ng? mi?n phí dành cho b?n. G?i s? [phone].        REGISTRATION AUTHORIZATION  Form No. 60381 (Rev. 7/13/2022)

## 2025-06-03 ENCOUNTER — PATIENT OUTREACH (OUTPATIENT)
Facility: HOSPITAL | Age: 40
End: 2025-06-03
Payer: MEDICAID